# Patient Record
Sex: MALE | Race: WHITE | NOT HISPANIC OR LATINO | URBAN - METROPOLITAN AREA
[De-identification: names, ages, dates, MRNs, and addresses within clinical notes are randomized per-mention and may not be internally consistent; named-entity substitution may affect disease eponyms.]

---

## 2024-10-29 ENCOUNTER — CLINICAL SUPPORT (OUTPATIENT)
Dept: URGENT CARE | Age: 64
End: 2024-10-29
Payer: COMMERCIAL

## 2024-10-29 VITALS
HEART RATE: 76 BPM | SYSTOLIC BLOOD PRESSURE: 135 MMHG | TEMPERATURE: 97.9 F | OXYGEN SATURATION: 97 % | RESPIRATION RATE: 18 BRPM | DIASTOLIC BLOOD PRESSURE: 87 MMHG

## 2025-03-19 ENCOUNTER — HOSPITAL ENCOUNTER (INPATIENT)
Facility: HOSPITAL | Age: 65
DRG: 862 | End: 2025-03-19
Attending: EMERGENCY MEDICINE | Admitting: INTERNAL MEDICINE
Payer: COMMERCIAL

## 2025-03-19 ENCOUNTER — APPOINTMENT (OUTPATIENT)
Dept: RADIOLOGY | Facility: HOSPITAL | Age: 65
DRG: 862 | End: 2025-03-19
Payer: COMMERCIAL

## 2025-03-19 ENCOUNTER — APPOINTMENT (OUTPATIENT)
Dept: CARDIOLOGY | Facility: HOSPITAL | Age: 65
DRG: 862 | End: 2025-03-19
Payer: COMMERCIAL

## 2025-03-19 DIAGNOSIS — K65.1 PELVIC ABSCESS IN MALE (MULTI): Primary | ICD-10-CM

## 2025-03-19 LAB
ALBUMIN SERPL BCP-MCNC: 4.8 G/DL (ref 3.4–5)
ALP SERPL-CCNC: 55 U/L (ref 33–136)
ALT SERPL W P-5'-P-CCNC: 13 U/L (ref 10–52)
ANION GAP SERPL CALC-SCNC: 15 MMOL/L (ref 10–20)
APPEARANCE UR: CLEAR
AST SERPL W P-5'-P-CCNC: 16 U/L (ref 9–39)
ATRIAL RATE: 107 BPM
BASOPHILS # BLD AUTO: 0.03 X10*3/UL (ref 0–0.1)
BASOPHILS NFR BLD AUTO: 0.3 %
BILIRUB SERPL-MCNC: 1.1 MG/DL (ref 0–1.2)
BILIRUB UR STRIP.AUTO-MCNC: NEGATIVE MG/DL
BNP SERPL-MCNC: 94 PG/ML (ref 0–99)
BUN SERPL-MCNC: 13 MG/DL (ref 6–23)
CALCIUM SERPL-MCNC: 9.5 MG/DL (ref 8.6–10.3)
CARDIAC TROPONIN I PNL SERPL HS: 8 NG/L (ref 0–20)
CHLORIDE SERPL-SCNC: 98 MMOL/L (ref 98–107)
CO2 SERPL-SCNC: 24 MMOL/L (ref 21–32)
COLOR UR: ABNORMAL
CREAT SERPL-MCNC: 1.01 MG/DL (ref 0.5–1.3)
CRP SERPL-MCNC: 9.19 MG/DL
EGFRCR SERPLBLD CKD-EPI 2021: 84 ML/MIN/1.73M*2
EOSINOPHIL # BLD AUTO: 0 X10*3/UL (ref 0–0.7)
EOSINOPHIL NFR BLD AUTO: 0 %
ERYTHROCYTE [DISTWIDTH] IN BLOOD BY AUTOMATED COUNT: 13.3 % (ref 11.5–14.5)
FLUAV RNA RESP QL NAA+PROBE: NOT DETECTED
FLUBV RNA RESP QL NAA+PROBE: NOT DETECTED
GLUCOSE SERPL-MCNC: 113 MG/DL (ref 74–99)
GLUCOSE UR STRIP.AUTO-MCNC: NORMAL MG/DL
HCT VFR BLD AUTO: 44.9 % (ref 41–52)
HGB BLD-MCNC: 15.6 G/DL (ref 13.5–17.5)
IMM GRANULOCYTES # BLD AUTO: 0.04 X10*3/UL (ref 0–0.7)
IMM GRANULOCYTES NFR BLD AUTO: 0.4 % (ref 0–0.9)
KETONES UR STRIP.AUTO-MCNC: NEGATIVE MG/DL
LACTATE SERPL-SCNC: 1.6 MMOL/L (ref 0.4–2)
LEUKOCYTE ESTERASE UR QL STRIP.AUTO: NEGATIVE
LYMPHOCYTES # BLD AUTO: 0.79 X10*3/UL (ref 1.2–4.8)
LYMPHOCYTES NFR BLD AUTO: 7 %
MAGNESIUM SERPL-MCNC: 1.57 MG/DL (ref 1.6–2.4)
MCH RBC QN AUTO: 29.1 PG (ref 26–34)
MCHC RBC AUTO-ENTMCNC: 34.7 G/DL (ref 32–36)
MCV RBC AUTO: 84 FL (ref 80–100)
MONOCYTES # BLD AUTO: 0.65 X10*3/UL (ref 0.1–1)
MONOCYTES NFR BLD AUTO: 5.8 %
NEUTROPHILS # BLD AUTO: 9.78 X10*3/UL (ref 1.2–7.7)
NEUTROPHILS NFR BLD AUTO: 86.5 %
NITRITE UR QL STRIP.AUTO: NEGATIVE
NRBC BLD-RTO: 0 /100 WBCS (ref 0–0)
P AXIS: 73 DEGREES
P OFFSET: 206 MS
P ONSET: 152 MS
PH UR STRIP.AUTO: 6 [PH]
PLATELET # BLD AUTO: 236 X10*3/UL (ref 150–450)
POTASSIUM SERPL-SCNC: 3.7 MMOL/L (ref 3.5–5.3)
PR INTERVAL: 130 MS
PROT SERPL-MCNC: 8.9 G/DL (ref 6.4–8.2)
PROT UR STRIP.AUTO-MCNC: NEGATIVE MG/DL
Q ONSET: 217 MS
QRS COUNT: 18 BEATS
QRS DURATION: 94 MS
QT INTERVAL: 378 MS
QTC CALCULATION(BAZETT): 504 MS
QTC FREDERICIA: 458 MS
R AXIS: 74 DEGREES
RBC # BLD AUTO: 5.37 X10*6/UL (ref 4.5–5.9)
RBC # UR STRIP.AUTO: NEGATIVE MG/DL
SARS-COV-2 RNA RESP QL NAA+PROBE: NOT DETECTED
SODIUM SERPL-SCNC: 133 MMOL/L (ref 136–145)
SP GR UR STRIP.AUTO: >1.05
T AXIS: 63 DEGREES
T OFFSET: 406 MS
UROBILINOGEN UR STRIP.AUTO-MCNC: NORMAL MG/DL
VENTRICULAR RATE: 107 BPM
WBC # BLD AUTO: 11.3 X10*3/UL (ref 4.4–11.3)

## 2025-03-19 PROCEDURE — 71046 X-RAY EXAM CHEST 2 VIEWS: CPT | Performed by: RADIOLOGY

## 2025-03-19 PROCEDURE — 74177 CT ABD & PELVIS W/CONTRAST: CPT | Performed by: RADIOLOGY

## 2025-03-19 PROCEDURE — 2500000004 HC RX 250 GENERAL PHARMACY W/ HCPCS (ALT 636 FOR OP/ED): Performed by: GENERAL PRACTICE

## 2025-03-19 PROCEDURE — 85025 COMPLETE CBC W/AUTO DIFF WBC: CPT | Performed by: NURSE PRACTITIONER

## 2025-03-19 PROCEDURE — 96375 TX/PRO/DX INJ NEW DRUG ADDON: CPT

## 2025-03-19 PROCEDURE — 2500000005 HC RX 250 GENERAL PHARMACY W/O HCPCS

## 2025-03-19 PROCEDURE — 80053 COMPREHEN METABOLIC PANEL: CPT | Performed by: NURSE PRACTITIONER

## 2025-03-19 PROCEDURE — 70450 CT HEAD/BRAIN W/O DYE: CPT

## 2025-03-19 PROCEDURE — 87075 CULTR BACTERIA EXCEPT BLOOD: CPT | Mod: AHULAB | Performed by: GENERAL PRACTICE

## 2025-03-19 PROCEDURE — 87075 CULTR BACTERIA EXCEPT BLOOD: CPT | Mod: AHULAB | Performed by: NURSE PRACTITIONER

## 2025-03-19 PROCEDURE — 70450 CT HEAD/BRAIN W/O DYE: CPT | Performed by: RADIOLOGY

## 2025-03-19 PROCEDURE — 36415 COLL VENOUS BLD VENIPUNCTURE: CPT | Performed by: NURSE PRACTITIONER

## 2025-03-19 PROCEDURE — 83880 ASSAY OF NATRIURETIC PEPTIDE: CPT | Performed by: NURSE PRACTITIONER

## 2025-03-19 PROCEDURE — 87040 BLOOD CULTURE FOR BACTERIA: CPT | Mod: AHULAB | Performed by: NURSE PRACTITIONER

## 2025-03-19 PROCEDURE — 2550000001 HC RX 255 CONTRASTS: Performed by: NURSE PRACTITIONER

## 2025-03-19 PROCEDURE — 87636 SARSCOV2 & INF A&B AMP PRB: CPT | Performed by: NURSE PRACTITIONER

## 2025-03-19 PROCEDURE — 81003 URINALYSIS AUTO W/O SCOPE: CPT | Performed by: NURSE PRACTITIONER

## 2025-03-19 PROCEDURE — 36415 COLL VENOUS BLD VENIPUNCTURE: CPT | Performed by: GENERAL PRACTICE

## 2025-03-19 PROCEDURE — 93005 ELECTROCARDIOGRAM TRACING: CPT

## 2025-03-19 PROCEDURE — 71046 X-RAY EXAM CHEST 2 VIEWS: CPT

## 2025-03-19 PROCEDURE — 96365 THER/PROPH/DIAG IV INF INIT: CPT

## 2025-03-19 PROCEDURE — 86140 C-REACTIVE PROTEIN: CPT | Performed by: INTERNAL MEDICINE

## 2025-03-19 PROCEDURE — 99285 EMERGENCY DEPT VISIT HI MDM: CPT | Mod: 25 | Performed by: EMERGENCY MEDICINE

## 2025-03-19 PROCEDURE — 2500000004 HC RX 250 GENERAL PHARMACY W/ HCPCS (ALT 636 FOR OP/ED)

## 2025-03-19 PROCEDURE — 84484 ASSAY OF TROPONIN QUANT: CPT | Performed by: NURSE PRACTITIONER

## 2025-03-19 PROCEDURE — 83605 ASSAY OF LACTIC ACID: CPT | Performed by: NURSE PRACTITIONER

## 2025-03-19 PROCEDURE — 2500000001 HC RX 250 WO HCPCS SELF ADMINISTERED DRUGS (ALT 637 FOR MEDICARE OP): Performed by: GENERAL PRACTICE

## 2025-03-19 PROCEDURE — 83735 ASSAY OF MAGNESIUM: CPT | Performed by: NURSE PRACTITIONER

## 2025-03-19 PROCEDURE — 1100000001 HC PRIVATE ROOM DAILY

## 2025-03-19 PROCEDURE — 74177 CT ABD & PELVIS W/CONTRAST: CPT

## 2025-03-19 RX ORDER — ACETAMINOPHEN 325 MG/1
975 TABLET ORAL ONCE
Status: COMPLETED | OUTPATIENT
Start: 2025-03-19 | End: 2025-03-19

## 2025-03-19 RX ORDER — MAGNESIUM SULFATE HEPTAHYDRATE 40 MG/ML
2 INJECTION, SOLUTION INTRAVENOUS ONCE
Status: COMPLETED | OUTPATIENT
Start: 2025-03-19 | End: 2025-03-20

## 2025-03-19 RX ORDER — TALC
6 POWDER (GRAM) TOPICAL NIGHTLY
Status: DISCONTINUED | OUTPATIENT
Start: 2025-03-19 | End: 2025-03-24 | Stop reason: HOSPADM

## 2025-03-19 RX ORDER — ACETAMINOPHEN 650 MG/1
650 SUPPOSITORY RECTAL EVERY 4 HOURS PRN
Status: DISCONTINUED | OUTPATIENT
Start: 2025-03-19 | End: 2025-03-24 | Stop reason: HOSPADM

## 2025-03-19 RX ORDER — ACETAMINOPHEN 160 MG/5ML
650 SOLUTION ORAL EVERY 6 HOURS PRN
Status: DISCONTINUED | OUTPATIENT
Start: 2025-03-19 | End: 2025-03-24 | Stop reason: HOSPADM

## 2025-03-19 RX ORDER — VANCOMYCIN HYDROCHLORIDE 1 G/20ML
INJECTION, POWDER, LYOPHILIZED, FOR SOLUTION INTRAVENOUS DAILY PRN
Status: DISCONTINUED | OUTPATIENT
Start: 2025-03-19 | End: 2025-03-19

## 2025-03-19 RX ORDER — POLYETHYLENE GLYCOL 3350 17 G/17G
17 POWDER, FOR SOLUTION ORAL DAILY
Status: DISCONTINUED | OUTPATIENT
Start: 2025-03-20 | End: 2025-03-24 | Stop reason: HOSPADM

## 2025-03-19 RX ORDER — ONDANSETRON HYDROCHLORIDE 2 MG/ML
4 INJECTION, SOLUTION INTRAVENOUS EVERY 8 HOURS PRN
Status: DISCONTINUED | OUTPATIENT
Start: 2025-03-19 | End: 2025-03-24 | Stop reason: HOSPADM

## 2025-03-19 RX ORDER — ONDANSETRON 4 MG/1
4 TABLET, FILM COATED ORAL EVERY 8 HOURS PRN
Status: DISCONTINUED | OUTPATIENT
Start: 2025-03-19 | End: 2025-03-24 | Stop reason: HOSPADM

## 2025-03-19 RX ORDER — ENOXAPARIN SODIUM 100 MG/ML
40 INJECTION SUBCUTANEOUS EVERY 24 HOURS
Status: DISCONTINUED | OUTPATIENT
Start: 2025-03-20 | End: 2025-03-24 | Stop reason: HOSPADM

## 2025-03-19 RX ORDER — LANOLIN ALCOHOL/MO/W.PET/CERES
400 CREAM (GRAM) TOPICAL ONCE
Status: COMPLETED | OUTPATIENT
Start: 2025-03-19 | End: 2025-03-19

## 2025-03-19 RX ORDER — VANCOMYCIN HYDROCHLORIDE 1 G/20ML
INJECTION, POWDER, LYOPHILIZED, FOR SOLUTION INTRAVENOUS DAILY PRN
Status: DISCONTINUED | OUTPATIENT
Start: 2025-03-19 | End: 2025-03-20

## 2025-03-19 RX ORDER — ACETAMINOPHEN 325 MG/1
975 TABLET ORAL EVERY 8 HOURS PRN
Status: DISCONTINUED | OUTPATIENT
Start: 2025-03-19 | End: 2025-03-24 | Stop reason: HOSPADM

## 2025-03-19 RX ADMIN — IOHEXOL 75 ML: 350 INJECTION, SOLUTION INTRAVENOUS at 17:28

## 2025-03-19 RX ADMIN — VANCOMYCIN HYDROCHLORIDE 2000 MG: 5 INJECTION, POWDER, LYOPHILIZED, FOR SOLUTION INTRAVENOUS at 21:00

## 2025-03-19 RX ADMIN — PIPERACILLIN SODIUM AND TAZOBACTAM SODIUM 3.38 G: 3; .375 INJECTION, SOLUTION INTRAVENOUS at 19:34

## 2025-03-19 RX ADMIN — ACETAMINOPHEN 975 MG: 325 TABLET, FILM COATED ORAL at 19:31

## 2025-03-19 RX ADMIN — Medication 400 MG: at 19:31

## 2025-03-19 SDOH — SOCIAL STABILITY: SOCIAL INSECURITY: DOES ANYONE TRY TO KEEP YOU FROM HAVING/CONTACTING OTHER FRIENDS OR DOING THINGS OUTSIDE YOUR HOME?: NO

## 2025-03-19 SDOH — SOCIAL STABILITY: SOCIAL INSECURITY
WITHIN THE LAST YEAR, HAVE YOU BEEN KICKED, HIT, SLAPPED, OR OTHERWISE PHYSICALLY HURT BY YOUR PARTNER OR EX-PARTNER?: NO

## 2025-03-19 SDOH — ECONOMIC STABILITY: HOUSING INSECURITY: IN THE LAST 12 MONTHS, WAS THERE A TIME WHEN YOU WERE NOT ABLE TO PAY THE MORTGAGE OR RENT ON TIME?: NO

## 2025-03-19 SDOH — ECONOMIC STABILITY: FOOD INSECURITY: WITHIN THE PAST 12 MONTHS, THE FOOD YOU BOUGHT JUST DIDN'T LAST AND YOU DIDN'T HAVE MONEY TO GET MORE.: NEVER TRUE

## 2025-03-19 SDOH — SOCIAL STABILITY: SOCIAL INSECURITY: DO YOU FEEL ANYONE HAS EXPLOITED OR TAKEN ADVANTAGE OF YOU FINANCIALLY OR OF YOUR PERSONAL PROPERTY?: NO

## 2025-03-19 SDOH — ECONOMIC STABILITY: HOUSING INSECURITY: AT ANY TIME IN THE PAST 12 MONTHS, WERE YOU HOMELESS OR LIVING IN A SHELTER (INCLUDING NOW)?: NO

## 2025-03-19 SDOH — SOCIAL STABILITY: SOCIAL INSECURITY: WITHIN THE LAST YEAR, HAVE YOU BEEN HUMILIATED OR EMOTIONALLY ABUSED IN OTHER WAYS BY YOUR PARTNER OR EX-PARTNER?: NO

## 2025-03-19 SDOH — SOCIAL STABILITY: SOCIAL INSECURITY: ARE YOU OR HAVE YOU BEEN THREATENED OR ABUSED PHYSICALLY, EMOTIONALLY, OR SEXUALLY BY ANYONE?: NO

## 2025-03-19 SDOH — SOCIAL STABILITY: SOCIAL INSECURITY: ARE THERE ANY APPARENT SIGNS OF INJURIES/BEHAVIORS THAT COULD BE RELATED TO ABUSE/NEGLECT?: NO

## 2025-03-19 SDOH — ECONOMIC STABILITY: FOOD INSECURITY: WITHIN THE PAST 12 MONTHS, YOU WORRIED THAT YOUR FOOD WOULD RUN OUT BEFORE YOU GOT THE MONEY TO BUY MORE.: NEVER TRUE

## 2025-03-19 SDOH — SOCIAL STABILITY: SOCIAL INSECURITY: WITHIN THE LAST YEAR, HAVE YOU BEEN AFRAID OF YOUR PARTNER OR EX-PARTNER?: NO

## 2025-03-19 SDOH — ECONOMIC STABILITY: INCOME INSECURITY: IN THE PAST 12 MONTHS HAS THE ELECTRIC, GAS, OIL, OR WATER COMPANY THREATENED TO SHUT OFF SERVICES IN YOUR HOME?: NO

## 2025-03-19 SDOH — SOCIAL STABILITY: SOCIAL INSECURITY: HAS ANYONE EVER THREATENED TO HURT YOUR FAMILY OR YOUR PETS?: NO

## 2025-03-19 SDOH — ECONOMIC STABILITY: TRANSPORTATION INSECURITY: IN THE PAST 12 MONTHS, HAS LACK OF TRANSPORTATION KEPT YOU FROM MEDICAL APPOINTMENTS OR FROM GETTING MEDICATIONS?: NO

## 2025-03-19 SDOH — ECONOMIC STABILITY: FOOD INSECURITY: HOW HARD IS IT FOR YOU TO PAY FOR THE VERY BASICS LIKE FOOD, HOUSING, MEDICAL CARE, AND HEATING?: NOT HARD AT ALL

## 2025-03-19 SDOH — SOCIAL STABILITY: SOCIAL INSECURITY: HAVE YOU HAD ANY THOUGHTS OF HARMING ANYONE ELSE?: NO

## 2025-03-19 SDOH — SOCIAL STABILITY: SOCIAL INSECURITY
WITHIN THE LAST YEAR, HAVE YOU BEEN RAPED OR FORCED TO HAVE ANY KIND OF SEXUAL ACTIVITY BY YOUR PARTNER OR EX-PARTNER?: NO

## 2025-03-19 SDOH — SOCIAL STABILITY: SOCIAL INSECURITY: WERE YOU ABLE TO COMPLETE ALL THE BEHAVIORAL HEALTH SCREENINGS?: YES

## 2025-03-19 SDOH — SOCIAL STABILITY: SOCIAL INSECURITY: HAVE YOU HAD THOUGHTS OF HARMING ANYONE ELSE?: NO

## 2025-03-19 SDOH — ECONOMIC STABILITY: HOUSING INSECURITY: IN THE PAST 12 MONTHS, HOW MANY TIMES HAVE YOU MOVED WHERE YOU WERE LIVING?: 0

## 2025-03-19 SDOH — SOCIAL STABILITY: SOCIAL INSECURITY: ABUSE: ADULT

## 2025-03-19 SDOH — SOCIAL STABILITY: SOCIAL INSECURITY: DO YOU FEEL UNSAFE GOING BACK TO THE PLACE WHERE YOU ARE LIVING?: NO

## 2025-03-19 ASSESSMENT — COGNITIVE AND FUNCTIONAL STATUS - GENERAL
PATIENT BASELINE BEDBOUND: NO
DAILY ACTIVITIY SCORE: 24
MOBILITY SCORE: 24

## 2025-03-19 ASSESSMENT — ACTIVITIES OF DAILY LIVING (ADL)
LACK_OF_TRANSPORTATION: NO
ADEQUATE_TO_COMPLETE_ADL: YES
FEEDING YOURSELF: INDEPENDENT
GROOMING: INDEPENDENT
PATIENT'S MEMORY ADEQUATE TO SAFELY COMPLETE DAILY ACTIVITIES?: YES
BATHING: INDEPENDENT
HEARING - LEFT EAR: FUNCTIONAL
JUDGMENT_ADEQUATE_SAFELY_COMPLETE_DAILY_ACTIVITIES: YES
HEARING - RIGHT EAR: FUNCTIONAL
LACK_OF_TRANSPORTATION: NO
WALKS IN HOME: INDEPENDENT
DRESSING YOURSELF: INDEPENDENT
TOILETING: INDEPENDENT

## 2025-03-19 ASSESSMENT — PAIN SCALES - GENERAL
PAINLEVEL_OUTOF10: 0 - NO PAIN

## 2025-03-19 ASSESSMENT — PAIN - FUNCTIONAL ASSESSMENT
PAIN_FUNCTIONAL_ASSESSMENT: 0-10
PAIN_FUNCTIONAL_ASSESSMENT: 0-10

## 2025-03-19 ASSESSMENT — PATIENT HEALTH QUESTIONNAIRE - PHQ9
SUM OF ALL RESPONSES TO PHQ9 QUESTIONS 1 & 2: 0
1. LITTLE INTEREST OR PLEASURE IN DOING THINGS: NOT AT ALL
2. FEELING DOWN, DEPRESSED OR HOPELESS: NOT AT ALL

## 2025-03-19 ASSESSMENT — LIFESTYLE VARIABLES
AUDIT-C TOTAL SCORE: 2
SKIP TO QUESTIONS 9-10: 1
AUDIT-C TOTAL SCORE: 2
HOW OFTEN DO YOU HAVE 6 OR MORE DRINKS ON ONE OCCASION: NEVER
HOW OFTEN DO YOU HAVE A DRINK CONTAINING ALCOHOL: 2-4 TIMES A MONTH
HOW MANY STANDARD DRINKS CONTAINING ALCOHOL DO YOU HAVE ON A TYPICAL DAY: 1 OR 2

## 2025-03-19 NOTE — ED TRIAGE NOTES
Secondary to patient volumes and overcrowding, I performed a brief medical screening exam of the patient in triage, as the patient awaits space in the main ED.    History of Present Illness:  Alexandre Barraza presents with   Chief Complaint   Patient presents with    Dizziness       Physical Exam:  General - In no acute distress  Respiratory - Breathing comfortably  Cardiac - Normal S1, S2, no m/g/r  Neuro - No focal neurologic deficits. Cranial nerves normal as tested from III through XII.  Bilateral upper and lower extremity strengths intact 5/5, sensation intact, DTRs 2+, no drift.  No neglect, no dysarthria, no word finding abnormality, finger-to-nose normal.   Eyes - EOMI, PERRL.  Test of skew negative.    Medical Decision Making:  Patient will require further evaluation in the main ED.    Initial diagnostic tests were ordered from triage.    The patient demonstrates understanding that this initial evaluation is a brief medical screening exam and the expectation is that they await for space in the main ED to be further evaluated.  The patient understands that, if they leave prior to further evaluation in the main ED after this initial evaluation in triage, they are doing so under their own accord knowing that their evaluation/work-up is not yet complete. The patient also understands that any preliminary diagnostic results, including abnormalities, may not be shared with them, if they choose to leave prior to further evaluation in the main ED.

## 2025-03-19 NOTE — ED PROVIDER NOTES
HPI   Chief Complaint   Patient presents with    Dizziness       HPI        Patient History   History reviewed. No pertinent past medical history.  Past Surgical History:   Procedure Laterality Date    TONSILLECTOMY  02/04/2014    Tonsillectomy With Adenoidectomy     No family history on file.  Social History     Tobacco Use    Smoking status: Unknown    Smokeless tobacco: Not on file   Substance Use Topics    Alcohol use: Not on file    Drug use: Not on file       Physical Exam   ED Triage Vitals [03/19/25 1607]   Temperature Heart Rate Respirations BP   (!) 39.2 °C (102.6 °F) (!) 105 18 123/77      Pulse Ox Temp Source Heart Rate Source Patient Position   95 % Temporal Monitor --      BP Location FiO2 (%)     -- --       Physical Exam      ED Course & MDM                  No data recorded     [place] Coma Scale Score: 15 (03/19/25 1605 : Marisela Durán RN)                           Medical Decision Making      Procedure  Procedures

## 2025-03-19 NOTE — ED TRIAGE NOTES
Pt to ED from home with complaint of dizziness. Pt states he was taking a shower and suddenly got chills and dizziness. Pt has nausea, fatigue, and struggling to find words (per partner). Pt lowered self to floor in shower, denies hitting head.    Provider at triage for Neuro assessment.     Pt denies chest pain, pt denies SOB

## 2025-03-20 ENCOUNTER — APPOINTMENT (OUTPATIENT)
Dept: RADIOLOGY | Facility: HOSPITAL | Age: 65
DRG: 862 | End: 2025-03-20
Payer: COMMERCIAL

## 2025-03-20 LAB
ANION GAP SERPL CALC-SCNC: 10 MMOL/L (ref 10–20)
BUN SERPL-MCNC: 16 MG/DL (ref 6–23)
CALCIUM SERPL-MCNC: 8.4 MG/DL (ref 8.6–10.3)
CHLORIDE SERPL-SCNC: 104 MMOL/L (ref 98–107)
CO2 SERPL-SCNC: 27 MMOL/L (ref 21–32)
CREAT SERPL-MCNC: 0.99 MG/DL (ref 0.5–1.3)
EGFRCR SERPLBLD CKD-EPI 2021: 86 ML/MIN/1.73M*2
GLUCOSE SERPL-MCNC: 111 MG/DL (ref 74–99)
HOLD SPECIMEN: NORMAL
INR PPP: 1.3 (ref 0.9–1.1)
POTASSIUM SERPL-SCNC: 4 MMOL/L (ref 3.5–5.3)
PROTHROMBIN TIME: 14.1 SECONDS (ref 9.8–12.4)
SODIUM SERPL-SCNC: 137 MMOL/L (ref 136–145)

## 2025-03-20 PROCEDURE — 2500000004 HC RX 250 GENERAL PHARMACY W/ HCPCS (ALT 636 FOR OP/ED): Performed by: STUDENT IN AN ORGANIZED HEALTH CARE EDUCATION/TRAINING PROGRAM

## 2025-03-20 PROCEDURE — 87075 CULTR BACTERIA EXCEPT BLOOD: CPT | Mod: AHULAB | Performed by: STUDENT IN AN ORGANIZED HEALTH CARE EDUCATION/TRAINING PROGRAM

## 2025-03-20 PROCEDURE — 2500000001 HC RX 250 WO HCPCS SELF ADMINISTERED DRUGS (ALT 637 FOR MEDICARE OP): Performed by: INTERNAL MEDICINE

## 2025-03-20 PROCEDURE — 0W9G30Z DRAINAGE OF PERITONEAL CAVITY WITH DRAINAGE DEVICE, PERCUTANEOUS APPROACH: ICD-10-PCS | Performed by: STUDENT IN AN ORGANIZED HEALTH CARE EDUCATION/TRAINING PROGRAM

## 2025-03-20 PROCEDURE — 36415 COLL VENOUS BLD VENIPUNCTURE: CPT

## 2025-03-20 PROCEDURE — 1100000001 HC PRIVATE ROOM DAILY

## 2025-03-20 PROCEDURE — 2720000007 HC OR 272 NO HCPCS

## 2025-03-20 PROCEDURE — 85610 PROTHROMBIN TIME: CPT | Performed by: INTERNAL MEDICINE

## 2025-03-20 PROCEDURE — 99152 MOD SED SAME PHYS/QHP 5/>YRS: CPT

## 2025-03-20 PROCEDURE — 99153 MOD SED SAME PHYS/QHP EA: CPT

## 2025-03-20 PROCEDURE — 49405 IMAGE CATH FLUID COLXN VISC: CPT

## 2025-03-20 PROCEDURE — 99221 1ST HOSP IP/OBS SF/LOW 40: CPT | Performed by: NURSE PRACTITIONER

## 2025-03-20 PROCEDURE — 2500000004 HC RX 250 GENERAL PHARMACY W/ HCPCS (ALT 636 FOR OP/ED): Performed by: INTERNAL MEDICINE

## 2025-03-20 PROCEDURE — C1769 GUIDE WIRE: HCPCS

## 2025-03-20 PROCEDURE — 2500000005 HC RX 250 GENERAL PHARMACY W/O HCPCS: Performed by: INTERNAL MEDICINE

## 2025-03-20 PROCEDURE — C1729 CATH, DRAINAGE: HCPCS

## 2025-03-20 PROCEDURE — 80048 BASIC METABOLIC PNL TOTAL CA: CPT

## 2025-03-20 PROCEDURE — 99223 1ST HOSP IP/OBS HIGH 75: CPT | Performed by: INTERNAL MEDICINE

## 2025-03-20 RX ORDER — FENTANYL CITRATE 50 UG/ML
INJECTION, SOLUTION INTRAMUSCULAR; INTRAVENOUS
Status: COMPLETED | OUTPATIENT
Start: 2025-03-20 | End: 2025-03-20

## 2025-03-20 RX ORDER — LIDOCAINE HYDROCHLORIDE 10 MG/ML
INJECTION, SOLUTION EPIDURAL; INFILTRATION; INTRACAUDAL; PERINEURAL
Status: COMPLETED | OUTPATIENT
Start: 2025-03-20 | End: 2025-03-20

## 2025-03-20 RX ORDER — MIDAZOLAM HYDROCHLORIDE 1 MG/ML
INJECTION INTRAMUSCULAR; INTRAVENOUS
Status: COMPLETED | OUTPATIENT
Start: 2025-03-20 | End: 2025-03-20

## 2025-03-20 RX ORDER — CALCIUM CARBONATE 200(500)MG
500 TABLET,CHEWABLE ORAL DAILY
Status: DISCONTINUED | OUTPATIENT
Start: 2025-03-20 | End: 2025-03-24 | Stop reason: HOSPADM

## 2025-03-20 RX ORDER — TADALAFIL 5 MG/1
5 TABLET ORAL DAILY PRN
COMMUNITY

## 2025-03-20 RX ORDER — MORPHINE SULFATE 2 MG/ML
2 INJECTION, SOLUTION INTRAMUSCULAR; INTRAVENOUS EVERY 4 HOURS PRN
Status: DISCONTINUED | OUTPATIENT
Start: 2025-03-20 | End: 2025-03-24 | Stop reason: HOSPADM

## 2025-03-20 RX ADMIN — Medication 6 MG: at 20:42

## 2025-03-20 RX ADMIN — PIPERACILLIN SODIUM AND TAZOBACTAM SODIUM 3.38 G: 3; .375 INJECTION, SOLUTION INTRAVENOUS at 10:35

## 2025-03-20 RX ADMIN — PIPERACILLIN SODIUM AND TAZOBACTAM SODIUM 3.38 G: 3; .375 INJECTION, SOLUTION INTRAVENOUS at 02:20

## 2025-03-20 RX ADMIN — ACETAMINOPHEN 975 MG: 325 TABLET, FILM COATED ORAL at 19:37

## 2025-03-20 RX ADMIN — ENOXAPARIN SODIUM 40 MG: 40 INJECTION SUBCUTANEOUS at 16:30

## 2025-03-20 RX ADMIN — MORPHINE SULFATE 2 MG: 2 INJECTION, SOLUTION INTRAMUSCULAR; INTRAVENOUS at 22:02

## 2025-03-20 RX ADMIN — MIDAZOLAM HYDROCHLORIDE 1 MG: 1 INJECTION, SOLUTION INTRAMUSCULAR; INTRAVENOUS at 14:52

## 2025-03-20 RX ADMIN — PIPERACILLIN SODIUM AND TAZOBACTAM SODIUM 3.38 G: 3; .375 INJECTION, SOLUTION INTRAVENOUS at 17:58

## 2025-03-20 RX ADMIN — CALCIUM CARBONATE (ANTACID) CHEW TAB 500 MG 500 MG: 500 CHEW TAB at 08:11

## 2025-03-20 RX ADMIN — FENTANYL CITRATE 50 MCG: 50 INJECTION INTRAMUSCULAR; INTRAVENOUS at 15:11

## 2025-03-20 RX ADMIN — MIDAZOLAM HYDROCHLORIDE 1 MG: 1 INJECTION, SOLUTION INTRAMUSCULAR; INTRAVENOUS at 15:11

## 2025-03-20 RX ADMIN — LIDOCAINE HYDROCHLORIDE 10 ML: 10 INJECTION, SOLUTION EPIDURAL; INFILTRATION; INTRACAUDAL; PERINEURAL at 15:12

## 2025-03-20 RX ADMIN — LIDOCAINE HYDROCHLORIDE 10 ML: 10 INJECTION, SOLUTION EPIDURAL; INFILTRATION; INTRACAUDAL; PERINEURAL at 15:27

## 2025-03-20 RX ADMIN — FENTANYL CITRATE 50 MCG: 50 INJECTION INTRAMUSCULAR; INTRAVENOUS at 14:51

## 2025-03-20 RX ADMIN — ACETAMINOPHEN 975 MG: 325 TABLET, FILM COATED ORAL at 08:11

## 2025-03-20 RX ADMIN — SODIUM CHLORIDE 1000 ML: 0.9 INJECTION, SOLUTION INTRAVENOUS at 00:47

## 2025-03-20 RX ADMIN — MAGNESIUM SULFATE HEPTAHYDRATE 2 G: 40 INJECTION, SOLUTION INTRAVENOUS at 00:06

## 2025-03-20 ASSESSMENT — PAIN SCALES - GENERAL
PAINLEVEL_OUTOF10: 0 - NO PAIN
PAINLEVEL_OUTOF10: 7
PAINLEVEL_OUTOF10: 0 - NO PAIN
PAINLEVEL_OUTOF10: 7
PAINLEVEL_OUTOF10: 0 - NO PAIN
PAINLEVEL_OUTOF10: 5 - MODERATE PAIN
PAINLEVEL_OUTOF10: 0 - NO PAIN

## 2025-03-20 ASSESSMENT — COGNITIVE AND FUNCTIONAL STATUS - GENERAL
DAILY ACTIVITIY SCORE: 24
MOBILITY SCORE: 24
DAILY ACTIVITIY SCORE: 24
MOBILITY SCORE: 24

## 2025-03-20 ASSESSMENT — PAIN DESCRIPTION - LOCATION
LOCATION: ABDOMEN
LOCATION: OTHER (COMMENT)

## 2025-03-20 ASSESSMENT — ENCOUNTER SYMPTOMS
MUSCULOSKELETAL NEGATIVE: 1
LIGHT-HEADEDNESS: 1
CHILLS: 1
PSYCHIATRIC NEGATIVE: 1
CARDIOVASCULAR NEGATIVE: 1
RESPIRATORY NEGATIVE: 1
NAUSEA: 1

## 2025-03-20 ASSESSMENT — PAIN - FUNCTIONAL ASSESSMENT
PAIN_FUNCTIONAL_ASSESSMENT: 0-10

## 2025-03-20 ASSESSMENT — ACTIVITIES OF DAILY LIVING (ADL): LACK_OF_TRANSPORTATION: NO

## 2025-03-20 ASSESSMENT — PAIN DESCRIPTION - DESCRIPTORS: DESCRIPTORS: DISCOMFORT

## 2025-03-20 ASSESSMENT — PAIN DESCRIPTION - ORIENTATION: ORIENTATION: RIGHT

## 2025-03-20 ASSESSMENT — PAIN SCALES - WONG BAKER: WONGBAKER_NUMERICALRESPONSE: HURTS LITTLE MORE

## 2025-03-20 NOTE — PROGRESS NOTES
Vancomycin Dosing by Pharmacy- INITIAL    Alexandre Barraza is a 64 y.o. year old male who Pharmacy has been consulted for vancomycin dosing for other Abdominal infection, empiric, Community Acquired . Based on the patient's indication and renal status this patient will be dosed based on a goal AUC of 400-600.     Renal function is currently stable.    Visit Vitals  /67 (BP Location: Right arm, Patient Position: Lying)   Pulse 81   Temp 37.2 °C (99 °F) (Temporal)   Resp 16        Lab Results   Component Value Date    CREATININE 1.01 2025        Patient weight is as follows:   Vitals:    25 2301   Weight: 84.1 kg (185 lb 6.5 oz)       Cultures:  No results found for the encounter in last 14 days.        No intake/output data recorded.  I/O during current shift:  No intake/output data recorded.    Temp (24hrs), Av.1 °C (100.5 °F), Min:37.2 °C (99 °F), Max:39.2 °C (102.6 °F)         Assessment/Plan     Patient has already been given a loading dose of 2000 mg given 2025 at 21:00  Will initiate vancomycin maintenance,  1750 mg every 24 hours.    This dosing regimen is predicted by [facility] to result in the following pharmacokinetic parameters:  Exposure target: AUC24 (range)400-600 mg/L.hr   VAO07-27: 447 mg/L.hr  AUC24,ss: 481 mg/L.hr  Probability of AUC24 > 400: 70 %  Ctrough,ss: 12.9 mg/L  Probability of Ctrough,ss > 20: 18 %      Follow-up level will be ordered on 2025 at 05:00 unless clinically indicated sooner.  Will continue to monitor renal function daily while on vancomycin and order serum creatinine at least every 48 hours if not already ordered.  Follow for continued vancomycin needs, clinical response, and signs/symptoms of toxicity.       Mecca Ordaz, CedricD

## 2025-03-20 NOTE — PROGRESS NOTES
Pharmacy Medication History     Source of Information: Patient     Additional concerns with the patient's PTA list.   ...No Meds... Except Tadalafil    The following updates were made to the Prior to Admission medication list:     Medications ADDED:   Tadalafil 5 mg tablet  Medications CHANGED:  N/A  Medications REMOVED:   N/A  Medications NOT TAKING:   N/A    Allergy reviewed : Yes    Meds 2 Beds : Yes    Outpatient pharmacy confirmed and updated in chart : Yes    Pharmacy name: [facility] ( [address]    The list below reflectives the updated PTA list. Please review each medication in order reconciliation for additional clarification and justification.    Prior to Admission Medications   Prescriptions Last Dose Informant   tadalafil (Cialis) 5 mg tablet 3/18/2025 Evening    Sig: Take 1 tablet (5 mg) by mouth once daily as needed for erectile dysfunction. Take 1-2 hours before sexual activity.      Facility-Administered Medications: None       The list below reflectives the updated allergy list. Please review each documented allergy for additional clarification and justification.    No Known Allergies       03/20/25 at 5:03 PM - Keerthi Avila

## 2025-03-20 NOTE — CARE PLAN
The patient's goals for the shift include      The clinical goals for the shift include pain management    Problem: Safety - Adult  Goal: Free from fall injury  Outcome: Progressing     Problem: Pain - Adult  Goal: Verbalizes/displays adequate comfort level or baseline comfort level  Outcome: Progressing

## 2025-03-20 NOTE — POST-PROCEDURE NOTE
Interventional Radiology Brief Postprocedure Note    Attending: Willie Ohara    Diagnosis: Pelvic fluid collections    Description of procedure: CT guided placement of a left 10F drainage catheter into the pelvic collection with removal of approximately 30cc clear yellow fluid, fluid sent for analysis.  CT guided placement of a right 10F drainage catheter into the pelvic collection with removal of approximately 10cc clear serosanguineous fluid, fluid sent for analysis.  Near complete resolution of the fluid collections on post procedure imaging.    Anesthesia:  Local and MAC Moderate    Complications: None    Estimated Blood Loss: minimal    Medications  As of 03/20/25 1605      magnesium oxide (Mag-Ox) tablet 400 mg (mg) Total dose:  400 mg Dosing weight:  83.9      Date/Time Rate/Dose/Volume Action       03/19/25  1931 400 mg Given               piperacillin-tazobactam (Zosyn) 3.375 g in dextrose (iso) IV 50 mL (g) Total dose:  3.375 g* Dosing weight:  83.9   *From user-documented volume     Date/Time Rate/Dose/Volume Action       03/19/25  1934 3.375 g (over 30 min) New Bag      2015  (over 30 min) Stopped     03/20/25  0220 3.375 g (over 30 min) New Bag      0250 50 mL       0325  (over 30 min) Stopped      1035 3.375 g (over 30 min) New Bag      1144  (over 30 min) Stopped               acetaminophen (Tylenol) tablet 975 mg (mg) Total dose:  1,950 mg Dosing weight:  83.9      Date/Time Rate/Dose/Volume Action       03/19/25  1931 975 mg Given     03/20/25  0811 975 mg Given               vancomycin (Vancocin) 2,000 mg in dextrose 5% 500 mL IV (mg) Total dose:  2,000 mg Dosing weight:  83.9      Date/Time Rate/Dose/Volume Action       03/19/25  2100 2,000 mg (over 120 min) New Bag      2308  (over 120 min) Stopped               magnesium sulfate 2 g in sterile water for injection 50 mL (mL/hr) Total dose:  2 g* Dosing weight:  83.9   *From user-documented volume     Date/Time Rate/Dose/Volume Action        03/20/25  0006 2 g - 25 mL/hr (over 120 min) New Bag      0219  (over 120 min) Stopped               acetaminophen (Tylenol) oral liquid 650 mg (mg) Total dose:  Cannot be calculated* Dosing weight:  83.9   *Administration dose not documented     Date/Time Rate/Dose/Volume Action       03/20/25 0811 *Not included in total See Alternative               acetaminophen (Tylenol) suppository 650 mg (mg) Total dose:  Cannot be calculated* Dosing weight:  83.9   *Administration dose not documented     Date/Time Rate/Dose/Volume Action       03/20/25 0811 *Not included in total See Alternative               melatonin tablet 6 mg (mg) Total dose:  0 mg* Dosing weight:  83.9   *Administration not included in total     Date/Time Rate/Dose/Volume Action       03/20/25  0008 *6 mg Missed               polyethylene glycol (Glycolax, Miralax) packet 17 g (g) Total dose:  0 g* Dosing weight:  83.9   *Administration not included in total     Date/Time Rate/Dose/Volume Action       03/20/25  1021 *17 g Missed               sodium chloride 0.9 % bolus 1,000 mL (mL/hr) Total volume:  1,000 mL* Dosing weight:  84.1   *From user-documented volume     Date/Time Rate/Dose/Volume Action       03/20/25  0047 1,000 mL - 500 mL/hr (over 120 min) New Bag      0325 1,000 mL Stopped               calcium carbonate (Tums) chewable tablet 500 mg (mg) Total dose:  500 mg Dosing weight:  84.1      Date/Time Rate/Dose/Volume Action       03/20/25  0811 500 mg Given               fentaNYL PF (Sublimaze) injection (mcg) Total dose:  100 mcg      Date/Time Rate/Dose/Volume Action       03/20/25  1451 50 mcg Given      1511 50 mcg Given               midazolam (Versed) injection (mg) Total dose:  2 mg      Date/Time Rate/Dose/Volume Action       03/20/25  1452 1 mg Given      1511 1 mg Given               lidocaine PF (Xylocaine) 10 mg/mL (1 %) injection (mL) Total volume:  20 mL      Date/Time Rate/Dose/Volume Action       03/20/25  1512 10 mL Given       1527 10 mL Given                   Fluid aspiration sent from left pelvic fluid collection. Fluid aspiration sent from right pelvic fluid collection.      See detailed result report with images in PACS.    The patient tolerated the procedure well without incident or complication and is in stable condition.

## 2025-03-20 NOTE — PROGRESS NOTES
Emergency Medicine Transition of Care Note.    I received Alexandre Barraza in signout from Dr. De Jesus.  Please see the previous ED provider note for all HPI, PE and MDM up to the time of signout. This is in addition to the primary record.    In brief Alexandre Barraza is an 64 y.o. male presenting for   Chief Complaint   Patient presents with    Dizziness        Diagnoses as of 03/19/25 2223   Pelvic abscess in male (Multi)       Medical Decision Making  Received patient in signout at this time. Patient is admitted to [facility] for intraabdominal pelvis mass s/p radical prostatectomy. Boarding in the emergency department at this time pending bed availability secondary to high patient volumes.  No acute concerns were vocalized and no significant events occurred while under my care.      Final diagnoses:   [K65.1] Pelvic abscess in male (Multi)           Procedure  Procedures    Yanira Brennan MD

## 2025-03-20 NOTE — CARE PLAN
The patient's goals for the shift include  remain free from fever    The clinical goals for the shift include Patient will remain free from falls/injury throughout shift

## 2025-03-20 NOTE — PROGRESS NOTES
03/20/25 0852   Discharge Planning   Living Arrangements Spouse/significant other   Support Systems Spouse/significant other;Family members   Assistance Needed none   Type of Residence Private residence   Number of Stairs to Enter Residence 0   Number of Stairs Within Residence 20   Do you have animals or pets at home? No   Who is requesting discharge planning? Provider   Home or Post Acute Services None   Expected Discharge Disposition Home   Does the patient need discharge transport arranged? No   Financial Resource Strain   How hard is it for you to pay for the very basics like food, housing, medical care, and heating? Not hard   Housing Stability   In the last 12 months, was there a time when you were not able to pay the mortgage or rent on time? N   In the past 12 months, how many times have you moved where you were living? 0   At any time in the past 12 months, were you homeless or living in a shelter (including now)? N   Transportation Needs   In the past 12 months, has lack of transportation kept you from medical appointments or from getting medications? no   In the past 12 months, has lack of transportation kept you from meetings, work, or from getting things needed for daily living? No     I met with patient at bedside and explained tcc role. He lives in a condo with his partner Hakan, ambulates independently and drives. Admitted with pelvic abscess, on IV abx, ID consulted. IR consulted for drain placement. TCC to continue to follow for dc needs.

## 2025-03-20 NOTE — CONSULTS
Consults    blayne For Consult  Abdominal abscess    History Of Present Illness  Alexandre Barraza is a 64 y.o. male presenting with chills and lightheadedness. He has a remote history of radical prostatectomy with lymph note resection in November. He recovered well, denies abdominal pain. He does note worsening low back pain for the past month. He is quite active, but notes his back pain limited his ability to walk long distances. Workup in the ED included WBC 11.3. CT A/P with two rim enhancing fluid collections for which IR was consulted for drainage.      Past Medical History  He has a past medical history of Prostate cancer (Multi).    Surgical History  He has a past surgical history that includes Tonsillectomy (02/04/2014).     Social History  He reports that he has quit smoking. His smoking use included cigarettes. He started smoking about 41 years ago. He has a 6 pack-year smoking history. He has never used smokeless tobacco. He reports current alcohol use. He reports that he does not currently use drugs.    Family History  No family history on file.     Allergies  Patient has no known allergies.    MEDS:    Current Facility-Administered Medications:     acetaminophen (Tylenol) tablet 975 mg, 975 mg, oral, q8h PRN, 975 mg at 03/20/25 0811 **OR** acetaminophen (Tylenol) oral liquid 650 mg, 650 mg, oral, q6h PRN **OR** acetaminophen (Tylenol) suppository 650 mg, 650 mg, rectal, q4h PRN, William Cadena MD    calcium carbonate (Tums) chewable tablet 500 mg, 500 mg, oral, Daily, Vickeynia German, DO, 500 mg at 03/20/25 0811    enoxaparin (Lovenox) syringe 40 mg, 40 mg, subcutaneous, q24h, William Cadena MD    melatonin tablet 6 mg, 6 mg, oral, Nightly, William Cadena MD    ondansetron (Zofran) tablet 4 mg, 4 mg, oral, q8h PRN **OR** ondansetron (Zofran) injection 4 mg, 4 mg, intravenous, q8h PRN, William Cadena MD    piperacillin-tazobactam (Zosyn) 3.375 g in dextrose (iso) IV 50 mL, 3.375 g, intravenous,  q8h, William Cadena MD, Last Rate: 0 mL/hr at 03/20/25 0325, 3.375 g at 03/20/25 1035    polyethylene glycol (Glycolax, Miralax) packet 17 g, 17 g, oral, Daily, William Cadena MD    vancomycin (Vancocin) pharmacy to dose - pharmacy monitoring, , miscellaneous, Daily PRN, William Cadena MD    vancomycin 1,750 mg in dextrose 5% 500 mL IV, 1,750 mg, intravenous, q24h, Mecca Ordaz, CedricD    Review of Systems  History obtained from chart review and the patient  General ROS: positive for  - chills  Respiratory ROS: no cough, shortness of breath, or wheezing  Cardiovascular ROS: no chest pain or dyspnea on exertion  Gastrointestinal ROS: no abdominal pain, change in bowel habits, or black or bloody stools  Genitourinary ROS: no dysuria, trouble voiding, or hematuria  Musculoskeletal ROS: positive for - pain in back - lower     Last Recorded Vitals  /79 (BP Location: Right arm, Patient Position: Lying)   Pulse 91   Temp 37.3 °C (99.1 °F) (Temporal)   Resp 17   Wt 84.1 kg (185 lb 6.5 oz)   SpO2 94%      Physical Exam  Orientation: oriented to person, place, time, and general circumstances  HEENT: normocephalic, atraumatic  Pulm: normal  Cardiac: Regular rate and rhythm  GI: soft, nontender, normal bowel sounds  Pulses: peripheral pulses symmetrical    Relevant Results    LABS:  Lab Results   Component Value Date    WBC 11.3 03/19/2025    HGB 15.6 03/19/2025    HCT 44.9 03/19/2025    MCV 84 03/19/2025     03/19/2025      Results from last 72 hours   Lab Units 03/20/25  0440   SODIUM mmol/L 137   POTASSIUM mmol/L 4.0   CHLORIDE mmol/L 104   CO2 mmol/L 27   BUN mg/dL 16   CREATININE mg/dL 0.99   GLUCOSE mg/dL 111*   CALCIUM mg/dL 8.4*   ANION GAP mmol/L 10   EGFR mL/min/1.73m*2 86     Results from last 72 hours   Lab Units 03/19/25  1628   ALK PHOS U/L 55   BILIRUBIN TOTAL mg/dL 1.1   PROTEIN TOTAL g/dL 8.9*   ALT U/L 13   AST U/L 16   ALBUMIN g/dL 4.8     Results from last 72 hours   Lab Units  03/20/25  0440   INR  1.3*       MICRO:  No results found for the last 14 days.      IMAGING:   CT head wo IV contrast   Final Result   1. No acute intracranial hemorrhage or mass effect.   2. Patchy white matter hypodensities which are nonspecific but likely   related to microangiopathic white matter changes.        Signed by: Titi Rodriguez 3/19/2025 6:18 PM   Dictation workstation:   [id number]      CT abdomen pelvis w IV contrast   Final Result   1. Bilateral pelvic sidewall extraperitoneal rim enhancing fluid   collections along the inferior margin of the external iliac veins,   likely abscesses.   2. Mild diffuse bladder wall thickening may reflect mild cystitis.   3. Postsurgical change related to prostatectomy.        Signed by: Titi Rodriguez 3/19/2025 6:30 PM   Dictation workstation:   [id number]      XR chest 2 views   Final Result   1.  No evidence of acute cardiopulmonary process.                  MACRO:   None        Signed by: Joseph Schoenberger 3/19/2025 4:43 PM   Dictation workstation:   [id number]      CT guided abscess fluid collection drainage visceral Perq    (Results Pending)          Assessment/Plan     This is a 64 year old male presenting with chills and lightheadedness. Remote history of radical prostatectomy with lymph note resection in November. Complains of low back pain and sudden onset chills. Workup in the ED included WBC 11.3. CT A/P was personally reviewed and discussed with IR attending. Imaging demonstrates a rim enhancing fluid collection in the right pelvic sidewall, small at only 3.3cm, as well as a 5.4cm left anterior pelvic collection concerning for abscess. Fluid collections appear amenable to percutaneous drainage.     Treatment options were discussed with the patient. Risks of a percutaneous drain insertion were reviewed including but not limited to bleeding, infection, or damage to surrounding structures were reviewed. Benefits of the procedure and  alternatives to treatment were also reviewed. Patient verbalizes understanding and wishes to proceed. They will further discuss with IR attending prior to procedure.     Will plan for CT guided abscess drains x2.     Sedation Plan    ASA 2     Mallampati class: II.      NPO since midnight.           Monserrat Pennington, APRN-CNP

## 2025-03-20 NOTE — ED PROVIDER NOTES
HPI   Chief Complaint   Patient presents with    Dizziness       HPI: 64-year-old male with no significant reported past medical history presents for fever and chills.  He says that earlier today he was in the shower when he began having chills.  He slept most of the afternoon and came to the ED because he felt persistently lightheaded.  He denies cough, chest pain, shortness of breath, rash, dysuria and sick contacts.      Limitations to history: None  Independent Historians: Patient  External Records Reviewed: HIE, outpatient notes, inpatient notes  ------------------------------------------------------------------------------------------------------------------------------------------  ROS: a ten point review of systems was performed and was negative except as per HPI.  ------------------------------------------------------------------------------------------------------------------------------------------  PMH / PSH: as per HPI, otherwise reviewed in EMR  MEDS: as per HPI, otherwise reviewed in EMR  ALLERGIES: as per HPI, otherwise reviewed in EMR  SocH:  as per HPI, otherwise reviewed in EMR  FH:  as per HPI, otherwise reviewed in EMR  ------------------------------------------------------------------------------------------------------------------------------------------  Physical Exam:  VS: As documented in the triage note and EMR flowsheet from this visit was reviewed  General: Well appearing. No acute distress.   Eyes:  Extraocular movements grossly intact. No scleral icterus. No discharge  HEENT:  Normocephalic.  Atraumatic  Neck: Moves neck freely. No gross masses  CV: Regular rhythm. No murmurs, rubs or gallops   Resp: Clear to auscultation bilaterally. No respiratory distress.    GI: Soft, no masses, nontender. No rebound tenderness or guarding  MSK: Symmetric muscle bulk. No deformities. No lower extremity edema.    Skin: Warm, dry, intact.   Neuro: No focal deficits.  A&O x3.   Psych: Appropriate for  situation  ------------------------------------------------------------------------------------------------------------------------------------------  Hospital Course / Medical Decision Making:  Independent Interpretations: ***  EKG as interpreted by me: ***    MDM: ***    Discussion of Management with Other Providers:   I discussed the patient/results with: Emergency medicine team    Final diagnosis and disposition as below.    Results for orders placed or performed during the hospital encounter of 03/19/25  -Urinalysis with Reflex Culture and Microscopic:   Collection Time: 03/19/25  4:15 PM       Result                      Value             Ref Range           Color, Urine                Light-Yellow      Light-Yellow*       Appearance, Urine           Clear             Clear               Specific Gravity, Urine     >1.050 (N)        1.005 - 1.035       pH, Urine                   6.0               5.0, 5.5, 6.*       Protein, Urine              NEGATIVE          NEGATIVE, 10*       Glucose, Urine              Normal            Normal mg/dL        Blood, Urine                NEGATIVE          NEGATIVE mg/*       Ketones, Urine              NEGATIVE          NEGATIVE mg/*       Bilirubin, Urine            NEGATIVE          NEGATIVE mg/*       Urobilinogen, Urine         Normal            Normal mg/dL        Nitrite, Urine              NEGATIVE          NEGATIVE            Leukocyte Esterase, Ur*     NEGATIVE          NEGATIVE       -ECG 12 Lead:   Collection Time: 03/19/25  4:15 PM       Result                      Value             Ref Range           Ventricular Rate            107               BPM                 Atrial Rate                 107               BPM                 OR Interval                 130               ms                  QRS Duration                94                ms                  QT Interval                 378               ms                  QTC Calculation(Bazett)     504                ms                  P Axis                      73                degrees             R Axis                      74                degrees             T Axis                      63                degrees             QRS Count                   18                beats               Q Onset                     217               ms                  P Onset                     152               ms                  P Offset                    206               ms                  T Offset                    406               ms                  QTC Fredericia              458               ms             -Sars-CoV-2 and Influenza A/B PCR:   Collection Time: 03/19/25  4:28 PM       Result                      Value             Ref Range           Flu A Result                Not Detected      Not Detected        Flu B Result                Not Detected      Not Detected        Coronavirus 2019, PCR       Not Detected      Not Detected   -B-Type Natriuretic Peptide:   Collection Time: 03/19/25  4:28 PM       Result                      Value             Ref Range           BNP                         94                0 - 99 pg/mL   -Troponin I, High Sensitivity:   Collection Time: 03/19/25  4:28 PM       Result                      Value             Ref Range           Troponin I, High Sensi*     8                 0 - 20 ng/L    -Comprehensive Metabolic Panel:   Collection Time: 03/19/25  4:28 PM       Result                      Value             Ref Range           Glucose                     113 (H)           74 - 99 mg/dL       Sodium                      133 (L)           136 - 145 mm*       Potassium                   3.7               3.5 - 5.3 mm*       Chloride                    98                98 - 107 mmo*       Bicarbonate                 24                21 - 32 mmol*       Anion Gap                   15                10 - 20 mmol*       Urea Nitrogen               13                6 - 23  mg/dL        Creatinine                  1.01              0.50 - 1.30 *       eGFR                        84                >60 mL/min/1*       Calcium                     9.5               8.6 - 10.3 m*       Albumin                     4.8               3.4 - 5.0 g/*       Alkaline Phosphatase        55                33 - 136 U/L        Total Protein               8.9 (H)           6.4 - 8.2 g/*       AST                         16                9 - 39 U/L          Bilirubin, Total            1.1               0.0 - 1.2 mg*       ALT                         13                10 - 52 U/L    -Magnesium:   Collection Time: 03/19/25  4:28 PM       Result                      Value             Ref Range           Magnesium                   1.57 (L)          1.60 - 2.40 *  -CBC and Auto Differential:   Collection Time: 03/19/25  4:28 PM       Result                      Value             Ref Range           WBC                         11.3              4.4 - 11.3 x*       nRBC                        0.0               0.0 - 0.0 /1*       RBC                         5.37              4.50 - 5.90 *       Hemoglobin                  15.6              13.5 - 17.5 *       Hematocrit                  44.9              41.0 - 52.0 %       MCV                         84                80 - 100 fL         MCH                         29.1              26.0 - 34.0 *       MCHC                        34.7              32.0 - 36.0 *       RDW                         13.3              11.5 - 14.5 %       Platelets                   236               150 - 450 x1*       Neutrophils %               86.5              40.0 - 80.0 %       Immature Granulocytes *     0.4               0.0 - 0.9 %         Lymphocytes %               7.0               13.0 - 44.0 %       Monocytes %                 5.8               2.0 - 10.0 %        Eosinophils %               0.0               0.0 - 6.0 %         Basophils %                 0.3                0.0 - 2.0 %         Neutrophils Absolute        9.78 (H)          1.20 - 7.70 *       Immature Granulocytes *     0.04              0.00 - 0.70 *       Lymphocytes Absolute        0.79 (L)          1.20 - 4.80 *       Monocytes Absolute          0.65              0.10 - 1.00 *       Eosinophils Absolute        0.00              0.00 - 0.70 *       Basophils Absolute          0.03              0.00 - 0.10 *  -Lactate:   Collection Time: 03/19/25  4:28 PM       Result                      Value             Ref Range           Lactate                     1.6               0.4 - 2.0 mm*  -C-reactive protein:   Collection Time: 03/19/25  4:28 PM       Result                      Value             Ref Range           C-Reactive Protein          9.19 (H)          <1.00 mg/dL    CT head wo IV contrast   Final Result    1. No acute intracranial hemorrhage or mass effect.    2. Patchy white matter hypodensities which are nonspecific but likely    related to microangiopathic white matter changes.          Signed by: Titi Rodriguez 3/19/2025 6:18 PM    Dictation workstation:   [id number]     CT abdomen pelvis w IV contrast   Final Result    1. Bilateral pelvic sidewall extraperitoneal rim enhancing fluid    collections along the inferior margin of the external iliac veins,    likely abscesses.    2. Mild diffuse bladder wall thickening may reflect mild cystitis.    3. Postsurgical change related to prostatectomy.          Signed by: Titi Rodriguez 3/19/2025 6:30 PM    Dictation workstation:   [id number]     XR chest 2 views   Final Result    1.  No evidence of acute cardiopulmonary process.                      MACRO:    None          Signed by: Joseph Schoenberger 3/19/2025 4:43 PM    Dictation workstation:   [id number]                   Patient History   History reviewed. No pertinent past medical history.  Past Surgical History:   Procedure Laterality Date    TONSILLECTOMY  02/04/2014    Tonsillectomy  With Adenoidectomy     No family history on file.  Social History     Tobacco Use    Smoking status: Unknown    Smokeless tobacco: Not on file   Substance Use Topics    Alcohol use: Not on file    Drug use: Not on file       Physical Exam   ED Triage Vitals [03/19/25 1607]   Temperature Heart Rate Respirations BP   (!) 39.2 °C (102.6 °F) (!) 105 18 123/77      Pulse Ox Temp Source Heart Rate Source Patient Position   95 % Temporal Monitor --      BP Location FiO2 (%)     -- --       Physical Exam      ED Course & MDM   Diagnoses as of 03/19/25 2219   Pelvic abscess in male (Multi)                 No data recorded     Aleksandra Coma Scale Score: 15 (03/19/25 1943 : Tamera Cuellar, RN)       NIH Stroke Scale: 0 (03/19/25 1943 : Tamera Cuellar, RN)                   Medical Decision Making      Procedure  Procedures

## 2025-03-20 NOTE — CONSULTS
"INFECTIOUS DISEASE INPATIENT INITIAL CONSULTATION    Referred By: William Cadena    Reason For Consult: abscess     HPI:  This is a 64 y.o. male with PMH of prostate CA s/p prostatectomy 11/2024 who presented with chills.    Fever 102.6 here. WBC normal but with left shift. UA without pyuria. Blood cx pending. CT with intra-abd abscesses.     Allergies:  Patient has no known allergies.     Vitals (Last 24 Hours):  Heart Rate:  []   Temp:  [36.1 °C (97 °F)-39.2 °C (102.6 °F)]   Resp:  [15-18]   BP: (102-141)/(66-99)   Height:  [182.9 cm (6')-182.9 cm (6' 0.01\")]   Weight:  [83.9 kg (185 lb)-84.1 kg (185 lb 6.5 oz)]   SpO2:  [94 %-98 %]      PHYSICAL EXAM:  Gen - NAD  Heart - RRR, no murmurs  Lungs - clear bilaterally, no wheezing  Abd - soft, no ttp, BS present  Ext - no LE edema  Skin - no rash    MEDS:    Current Facility-Administered Medications:     acetaminophen (Tylenol) tablet 975 mg, 975 mg, oral, q8h PRN, 975 mg at 03/20/25 0811 **OR** acetaminophen (Tylenol) oral liquid 650 mg, 650 mg, oral, q6h PRN **OR** acetaminophen (Tylenol) suppository 650 mg, 650 mg, rectal, q4h PRN, William Cadena MD    calcium carbonate (Tums) chewable tablet 500 mg, 500 mg, oral, Daily, Vickeysonny German, DO, 500 mg at 03/20/25 0811    enoxaparin (Lovenox) syringe 40 mg, 40 mg, subcutaneous, q24h, William Cadena MD    melatonin tablet 6 mg, 6 mg, oral, Nightly, William Cadena MD    ondansetron (Zofran) tablet 4 mg, 4 mg, oral, q8h PRN **OR** ondansetron (Zofran) injection 4 mg, 4 mg, intravenous, q8h PRN, William Cadena MD    piperacillin-tazobactam (Zosyn) 3.375 g in dextrose (iso) IV 50 mL, 3.375 g, intravenous, q8h, William Cadena MD, Stopped at 03/20/25 1144    polyethylene glycol (Glycolax, Miralax) packet 17 g, 17 g, oral, Daily, William Cadnea MD    vancomycin (Vancocin) pharmacy to dose - pharmacy monitoring, , miscellaneous, Daily PRN, William Cadena MD    vancomycin 1,750 mg in dextrose 5% 500 mL IV, " 1,750 mg, intravenous, q24h, Mecca Ordaz, Beni     LABS:  Lab Results   Component Value Date    WBC 11.3 03/19/2025    HGB 15.6 03/19/2025    HCT 44.9 03/19/2025    MCV 84 03/19/2025     03/19/2025      Results from last 72 hours   Lab Units 03/20/25  0440   SODIUM mmol/L 137   POTASSIUM mmol/L 4.0   CHLORIDE mmol/L 104   CO2 mmol/L 27   BUN mg/dL 16   CREATININE mg/dL 0.99   GLUCOSE mg/dL 111*   CALCIUM mg/dL 8.4*   ANION GAP mmol/L 10   EGFR mL/min/1.73m*2 86     Results from last 72 hours   Lab Units 03/19/25  1628   ALK PHOS U/L 55   BILIRUBIN TOTAL mg/dL 1.1   PROTEIN TOTAL g/dL 8.9*   ALT U/L 13   AST U/L 16   ALBUMIN g/dL 4.8     Estimated Creatinine Clearance: 84.9 mL/min (by C-G formula based on SCr of 0.99 mg/dL).    C-Reactive Protein   Date/Time Value Ref Range Status   03/19/2025 04:28 PM 9.19 (H) <1.00 mg/dL Final     IMAGING:  CT A/P 3/19  Impression:     1. Bilateral pelvic sidewall extraperitoneal rim enhancing fluid  collections along the inferior margin of the external iliac veins,  likely abscesses.  2. Mild diffuse bladder wall thickening may reflect mild cystitis.  3. Postsurgical change related to prostatectomy.     CXR 3/19  Impression:     1.  No evidence of acute cardiopulmonary process.     ASSESSMENT/PLAN:    Intra-Abd Abscesses    Planned for IR drainage. IV Zosyn. Stopped IV Vanc.    Monitoring for adverse effects of abx such as rash/itching/diarrhea.    Will follow. Thanks!    Ovi Metz MD  [facility]  Office #[phone]

## 2025-03-21 LAB
ALBUMIN SERPL BCP-MCNC: 3.6 G/DL (ref 3.4–5)
ANION GAP SERPL CALC-SCNC: 10 MMOL/L (ref 10–20)
BUN SERPL-MCNC: 12 MG/DL (ref 6–23)
CALCIUM SERPL-MCNC: 8.4 MG/DL (ref 8.6–10.3)
CHLORIDE SERPL-SCNC: 104 MMOL/L (ref 98–107)
CO2 SERPL-SCNC: 26 MMOL/L (ref 21–32)
CREAT SERPL-MCNC: 0.85 MG/DL (ref 0.5–1.3)
EGFRCR SERPLBLD CKD-EPI 2021: >90 ML/MIN/1.73M*2
ERYTHROCYTE [DISTWIDTH] IN BLOOD BY AUTOMATED COUNT: 13.5 % (ref 11.5–14.5)
GLUCOSE SERPL-MCNC: 127 MG/DL (ref 74–99)
HCT VFR BLD AUTO: 37.8 % (ref 41–52)
HGB BLD-MCNC: 13.2 G/DL (ref 13.5–17.5)
MCH RBC QN AUTO: 29.5 PG (ref 26–34)
MCHC RBC AUTO-ENTMCNC: 34.9 G/DL (ref 32–36)
MCV RBC AUTO: 84 FL (ref 80–100)
NRBC BLD-RTO: 0 /100 WBCS (ref 0–0)
PHOSPHATE SERPL-MCNC: 2.8 MG/DL (ref 2.5–4.9)
PLATELET # BLD AUTO: 163 X10*3/UL (ref 150–450)
POTASSIUM SERPL-SCNC: 3.7 MMOL/L (ref 3.5–5.3)
RBC # BLD AUTO: 4.48 X10*6/UL (ref 4.5–5.9)
SODIUM SERPL-SCNC: 136 MMOL/L (ref 136–145)
WBC # BLD AUTO: 10.4 X10*3/UL (ref 4.4–11.3)

## 2025-03-21 PROCEDURE — 1100000001 HC PRIVATE ROOM DAILY

## 2025-03-21 PROCEDURE — 2500000001 HC RX 250 WO HCPCS SELF ADMINISTERED DRUGS (ALT 637 FOR MEDICARE OP): Performed by: INTERNAL MEDICINE

## 2025-03-21 PROCEDURE — 99232 SBSQ HOSP IP/OBS MODERATE 35: CPT | Performed by: INTERNAL MEDICINE

## 2025-03-21 PROCEDURE — 2500000004 HC RX 250 GENERAL PHARMACY W/ HCPCS (ALT 636 FOR OP/ED): Performed by: INTERNAL MEDICINE

## 2025-03-21 PROCEDURE — 2500000005 HC RX 250 GENERAL PHARMACY W/O HCPCS: Performed by: INTERNAL MEDICINE

## 2025-03-21 PROCEDURE — 99233 SBSQ HOSP IP/OBS HIGH 50: CPT | Performed by: NURSE PRACTITIONER

## 2025-03-21 PROCEDURE — 80069 RENAL FUNCTION PANEL: CPT | Performed by: INTERNAL MEDICINE

## 2025-03-21 PROCEDURE — 85027 COMPLETE CBC AUTOMATED: CPT | Performed by: INTERNAL MEDICINE

## 2025-03-21 PROCEDURE — 36415 COLL VENOUS BLD VENIPUNCTURE: CPT | Performed by: INTERNAL MEDICINE

## 2025-03-21 RX ADMIN — PIPERACILLIN SODIUM AND TAZOBACTAM SODIUM 3.38 G: 3; .375 INJECTION, SOLUTION INTRAVENOUS at 02:15

## 2025-03-21 RX ADMIN — PIPERACILLIN SODIUM AND TAZOBACTAM SODIUM 3.38 G: 3; .375 INJECTION, SOLUTION INTRAVENOUS at 11:02

## 2025-03-21 RX ADMIN — CALCIUM CARBONATE (ANTACID) CHEW TAB 500 MG 500 MG: 500 CHEW TAB at 09:05

## 2025-03-21 RX ADMIN — PIPERACILLIN SODIUM AND TAZOBACTAM SODIUM 3.38 G: 3; .375 INJECTION, SOLUTION INTRAVENOUS at 18:47

## 2025-03-21 RX ADMIN — Medication 6 MG: at 20:50

## 2025-03-21 RX ADMIN — ENOXAPARIN SODIUM 40 MG: 40 INJECTION SUBCUTANEOUS at 16:48

## 2025-03-21 ASSESSMENT — COGNITIVE AND FUNCTIONAL STATUS - GENERAL
MOBILITY SCORE: 24
DAILY ACTIVITIY SCORE: 24
MOBILITY SCORE: 24
DAILY ACTIVITIY SCORE: 24

## 2025-03-21 ASSESSMENT — PAIN SCALES - WONG BAKER: WONGBAKER_NUMERICALRESPONSE: NO HURT

## 2025-03-21 ASSESSMENT — PAIN SCALES - GENERAL
PAINLEVEL_OUTOF10: 0 - NO PAIN
PAINLEVEL_OUTOF10: 0 - NO PAIN

## 2025-03-21 NOTE — DISCHARGE INSTRUCTIONS
Freddy-Carr Drain Care  WHAT YOU NEED TO KNOW:    A Freddy-Carr (MARIANA) drain is used to remove fluids that build up in an area of your body after  surgery. The MARIANA drain is a bulb shaped device connected to a tube. One end of the tube is placed  inside you during surgery. The other end comes out through a small cut in your skin. The bulb is  connected to this end. You may have a stitch to hold the tube in place.    DISCHARGE INSTRUCTIONS:  How a Freddy-Carr drain works: The MARIANA drain removes fluids by creating suction in the tube.  The bulb is squeezed flat and connected to the tube that sticks out of your body. The bulb  expands as it fills with fluid.    How to change the bandage around your Freddy-Carr drain: If you have a bandage, change  it once a day. You may need to change your bandage more than once a day if it gets completely  wet.  ? Wash your hands with soap and water.  ? Loosen the tape and gently remove the old bandage. Throw the old bandage into a plastic  trash bag.  ? Use soap and water or saline (salt water) solution to clean your AMRIANA drain site. Dip a cotton  swab or gauze pad in the solution and gently clean your skin.  ? Pat the area dry.  ? Place a new bandage on your MARIANA drain site and secure it to your skin with medical tape.  ? Wash your hands.  How to empty the Freddy-Carr drain: Empty the bulb when it is half full or every 8 to 12  hours.  ? Wash your hands with soap and water.  ? Remove the plug from the bulb.  ? Pour the fluid into a measuring cup.  ? Clean the plug with an alcohol swab or a cotton ball dipped in rubbing alcohol.  ? Squeeze the bulb flat and put the plug back in. The bulb should stay flat until it starts to  fill with fluid again.  ? Measure the amount of fluid you pour out. Write down how much fluid you empty from  the MARIANA drain and the date and time you collected it.  ? Flush the fluid down the toilet. Wash your hands.    Freddy-Carr drain removal: The amount of  fluid that you drain will decrease as your wound  heals. The MARIANA drain usually is removed when less than 30 milliliters (2 tablespoons) is collected  in 24 hours. Ask your healthcare provider when and how your MARIANA drain will be removed.  Follow up with your healthcare provider as directed: Write down your questions so you  remember to ask them during your visits.    Contact your healthcare provider if:  ? You drain less than 30 milliliters (2 tablespoons) in 24 hours. This may mean your drain  can be removed.  ? You suddenly stop draining fluid or think your MARIANA drain is blocked.  ? You have a fever higher than 101.5°F (38.6°C).  ? You have increased pain, redness, or swelling around the drain site.  ? You have questions about your MARIANA drain care.    Seek care immediately if:  ? Your MARIANA drain breaks or comes out.  ? You have cloudy yellow or brown drainage from your MARIANA drain site, or the drainage  smells bad.

## 2025-03-21 NOTE — ED NOTES
Community Resource Name: No primary care physician.  Phone Number:   Staff Member:  Sandra Kearney    Discussed the following topics on behalf of the patient:  []  Behavioral Health Assistance     []  Case Management  []   Assistance  []  Digital Equity Assistance  []  Dental Health Assistance  []  Education Assistance  []  Employment Assistance  []  Financial Strain Relief Assistance  []  Food Insecurity Assistance  [x]  Healthcare Coverage Assistance  []  Housing Stability Assistance  []  IP Violence Relief Assistance  []  Legal Assistance  []  Physical Activity Assistance  []  Social Connection Assistance  []  Stress Relief Assistance   []  Substance Abuse Assistance  []  Transportation Assistance  []  Utility Assistance  [x]  Other: [insert comment here]  Patient does have a PCP. CHW updated the patient chart.  Next Steps:         GUS Parada  Track patients for community healthcare services. CHW talked to patient regarding not having a primary care physician. Patient expressed that he does have a primary care physician from [facility] named Dr. Vern Han and Jose hendrickson. CHW updated the patient chart with the physician name added. Patient expressed appreciation.        GUS Parada  03/21/25 2309

## 2025-03-21 NOTE — PROGRESS NOTES
Subjective   Interval History: has complaints pain at the right drain site. Denies ongoing fever or chills. 30ml out from left drain, 10ml from right drain overnight..     Objective   Vital signs in last 24 hours:  /77 (BP Location: Right arm, Patient Position: Lying)   Pulse 71   Temp 36.6 °C (97.9 °F) (Temporal)   Resp 17   Wt 84.1 kg (185 lb 6.5 oz)   SpO2 97%     Intake/Output last 3 shifts:  I/O last 3 completed shifts:  In: 1550 (18.4 mL/kg) [P.O.:300; I.V.:50 (0.6 mL/kg); IV Piggyback:1200]  Out: 240 (2.9 mL/kg) [Urine:200 (0.1 mL/kg/hr); Drains:40]  Weight: 84.1 kg   Intake/Output this shift:  I/O this shift:  In: 50 [IV Piggyback:50]  Out: -     Physical Exam  Neuro: oriented to person, place, time, and general circumstances  HEENT: normocephalic, atraumatic  Pulm: normal  Cardiac: Regular rate and rhythm  Abdomen: soft, nontender, normal bowel sounds  Pulses: 2+ and symmetric    Relevant Results  LABS:  Lab Results   Component Value Date    WBC 10.4 03/21/2025    HGB 13.2 (L) 03/21/2025    HCT 37.8 (L) 03/21/2025    MCV 84 03/21/2025     03/21/2025      Results from last 72 hours   Lab Units 03/21/25  0451   SODIUM mmol/L 136   POTASSIUM mmol/L 3.7   CHLORIDE mmol/L 104   CO2 mmol/L 26   BUN mg/dL 12   CREATININE mg/dL 0.85   GLUCOSE mg/dL 127*   CALCIUM mg/dL 8.4*   ANION GAP mmol/L 10   EGFR mL/min/1.73m*2 >90     Results from last 72 hours   Lab Units 03/21/25  0451 03/19/25  1628   ALK PHOS U/L  --  55   BILIRUBIN TOTAL mg/dL  --  1.1   PROTEIN TOTAL g/dL  --  8.9*   ALT U/L  --  13   AST U/L  --  16   ALBUMIN g/dL 3.6 4.8     Results from last 72 hours   Lab Units 03/20/25  0440   INR  1.3*       MICRO:  No results found for the last 14 days.      IMAGING:  CT guided abscess fluid collection drainage visceral Perq   Final Result   Uneventful CT guided 10 Armenian pigtail drainage catheter placement of   the left pelvic fluid collection, as detailed above. Uneventful   CT-guided 10  Swedish pigtail drainage catheter placement of the right   pelvic fluid collection, as detailed above.        Performed and dictated at [facility].        MACRO:   None        Signed by: Quincy Ohara 3/21/2025 11:41 AM   Dictation workstation:   [id number]      CT head wo IV contrast   Final Result   1. No acute intracranial hemorrhage or mass effect.   2. Patchy white matter hypodensities which are nonspecific but likely   related to microangiopathic white matter changes.        Signed by: Titi Rodriguez 3/19/2025 6:18 PM   Dictation workstation:   [id number]      CT abdomen pelvis w IV contrast   Final Result   1. Bilateral pelvic sidewall extraperitoneal rim enhancing fluid   collections along the inferior margin of the external iliac veins,   likely abscesses.   2. Mild diffuse bladder wall thickening may reflect mild cystitis.   3. Postsurgical change related to prostatectomy.        Signed by: Titi Rodriguez 3/19/2025 6:30 PM   Dictation workstation:   [id number]      XR chest 2 views   Final Result   1.  No evidence of acute cardiopulmonary process.                  MACRO:   None        Signed by: Joseph Schoenberger 3/19/2025 4:43 PM   Dictation workstation:   [id number]          Assessment/Plan   This is a 64 year old male presenting with chills and lightheadedness. Remote history of radical prostatectomy with lymph note resection in November. Complains of low back pain and sudden onset chills. Workup in the ED included WBC 11.3. CT A/P was personally reviewed and discussed with IR attending. Imaging demonstrates a rim enhancing fluid collection in the right pelvic sidewall, small at only 3.3cm, as well as a 5.4cm left anterior pelvic collection concerning for abscess. S/p bilateral percutaneous drain placement 3/20/25 with serous output.     Drains flushed with NS, left flushed easily, right with some resistance.   Left drain with 30ml serous  output. Right drain with 10ml serosang output.   Suspect the right sided collection (previously 3cm) has collapsed.   Continue drainage, empty and record output Qshift.   Cultures pending, +rare GPC so far. ID following.     Will reassess drain output on Monday for possible drain removal. Otherwise if discharged this weekend, recommend 1 week CT and followup with IR for drain removal.      LOS: 2 days     Monserrat Pennington, APRN-CNP    ed above.

## 2025-03-21 NOTE — PROGRESS NOTES
03/21/25 0739   Discharge Planning   Home or Post Acute Services None   Expected Discharge Disposition Home     Patient on strict bed rest. Patient had L and R 10 F drain catheters placed yesterday by IR.  Fluid was sent for culture.  Patient receiving IV antibiotics. ID following.  Patient is currently afebrile.  Plan remains for patient to return home upon discharge.  No home going needs identified at this time.  Will continue to follow for discharge planning needs.

## 2025-03-21 NOTE — CARE PLAN
The patient's goals for the shift include      The clinical goals for the shift include pain management        Problem: Pain - Adult  Goal: Verbalizes/displays adequate comfort level or baseline comfort level  Outcome: Progressing     Problem: Safety - Adult  Goal: Free from fall injury  Outcome: Progressing     Problem: Discharge Planning  Goal: Discharge to home or other facility with appropriate resources  Outcome: Progressing     Problem: Chronic Conditions and Co-morbidities  Goal: Patient's chronic conditions and co-morbidity symptoms are monitored and maintained or improved  Outcome: Progressing     Problem: Nutrition  Goal: Nutrient intake appropriate for maintaining nutritional needs  Outcome: Progressing

## 2025-03-21 NOTE — CARE PLAN
The patient's goals for the shift include      The clinical goals for the shift include to be pain free      Problem: Pain - Adult  Goal: Verbalizes/displays adequate comfort level or baseline comfort level  Outcome: Progressing

## 2025-03-21 NOTE — PROGRESS NOTES
Alexandre Barraza is a 64 y.o. male on day 2 of admission presenting with Pelvic abscess in male (Multi).      Subjective   Patient was seen and examined at bedside this morning, stated that she is tolerating his pain, internal drain has been emptied this morning, no blood in the, I report normal bowel without blood as well.  All review of systems patient denies fever, chills, nausea, vomiting, chest pain or shortness of breath       Objective     Last Recorded Vitals  /83 (BP Location: Right arm, Patient Position: Lying)   Pulse 85   Temp 37.1 °C (98.8 °F) (Temporal)   Resp 17   Wt 84.1 kg (185 lb 6.5 oz)   SpO2 98%   Intake/Output last 3 Shifts:    Intake/Output Summary (Last 24 hours) at 3/21/2025 1948  Last data filed at 3/21/2025 1847  Gross per 24 hour   Intake 550 ml   Output 40 ml   Net 510 ml       Admission Weight  Weight: 83.9 kg (185 lb) (03/19/25 1607)    Daily Weight  03/19/25 : 84.1 kg (185 lb 6.5 oz)    Image Results  CT guided abscess fluid collection drainage visceral Perq  Narrative: Interpreted By:  Quincy Ohara,   STUDY:  CT GUIDED ABSCESS FLUID COLLECTION DRAINAGE VISCERAL PERQ;  3/20/2025  4:08 pm      INDICATION:  Signs/Symptoms:request drains for abscesses.      COMPARISON:  None.      ACCESSION NUMBER(S):  [id number]      ORDERING CLINICIAN:  LILA WONG      TECHNIQUE:  INTERVENTIONALIST(S):  Willie Ohara MD      CONSENT:  The patient/patient's POA/next of kin was informed of the nature of  the proposed procedure. The purposes, alternatives, risks, and  benefits were explained and discussed. All questions were answered  and consent was obtained.      RADIATION EXPOSURE:  DLP: 791.3 mGy*cm      SEDATION:  Moderate conscious IV sedation services (supervision of  administration, induction, and maintenance) were provided by the  physician performing the procedure with intravenous fentanyl 100 mcg  and versed 2 mg for 52 minutes. The physician was assisted by  an  independent trained observer, an interventional radiology nurse, in  the continuous monitoring of patient level of consciousness and  physiologic status.      MEDICATION/CONTRAST:  No additional      TIME OUT:  A time out was performed immediately prior to procedure start with  the interventional team, correctly identifying the patient name, date  of birth, MRN, procedure, anatomy (including marking of site and  side), patient position, procedure consent form, relevant laboratory  and imaging test results, antibiotic administration, safety  precautions, and procedure-specific equipment needs.      COMPLICATIONS:  No immediate adverse events identified.      FINDINGS:  Patient was placed in supine position. Limited axial CT images were  obtained through the abdomen and pelvis for the purposes of needle  guidance were taken. The images demonstrate the previously described  fluid collections within the right and left pelvis. Percutaneous  entry points for access into these 2 collections was identified.  Patient was prepped and draped in the usual sterile fashion.      Attention was 1st turned to drainage of the left pelvic collection.  Lidocaine was used for local anesthesia. Utilizing intermittent CT  guidance a 19G Yueh needle was used to access the fluid collection. A  035 wire was then used to maintain access in the collection after  removal of the Yueh needle. Serial dilatation was had over the wire  and an eventual 10 [place] pigtail drainage catheter was placed in the  collection. The pigtail drain was formed, connected to drain, and  sutured/secured in place. Proximally 30 cc of clear yellow fluid was  sent for analysis.      Attention was next turned to drainage of the right pelvic collection.  Lidocaine was used for local anesthesia. Utilizing intermittent CT  guidance a 19G Yueh needle was used to access the fluid collection. A  035 wire was then used to maintain access in the collection after  removal of  the Yueh needle. Serial dilatation was had over the wire  and an eventual 10 [place] pigtail drainage catheter was placed in the  collection. The pigtail drain was formed, connected to drain, and  sutured/secured in place. Proximally 10 cc of clear yellow fluid was  sent for analysis.      Postprocedure images demonstrated no evidence of hemorrhage and  near-complete resolution of the bilateral pelvic fluid collections.  The patient tolerated the procedure well without any immediate  complications.      Impression: Uneventful CT guided 10 [place] pigtail drainage catheter placement of  the left pelvic fluid collection, as detailed above. Uneventful  CT-guided 10 [place] pigtail drainage catheter placement of the right  pelvic fluid collection, as detailed above.      Performed and dictated at [facility].      MACRO:  None      Signed by: Quincy Ohara 3/21/2025 11:41 AM  Dictation workstation:   [id number]      Physical Exam  Constitutional: Alert active, cooperative not in acute distress  Eyes: PERRLA, clear sclera  ENMT: Moist mucosal membranes, no exudate  Head / Neck: Atraumatic, normocephalic, supple neck, JVP not visualized  Lungs: Patent airways, CTABL  Heart: RRR, S1S2, no murmurs appreciated, palpable pulses in all extremities  GI: Soft, NT, ND, bowel sounds present in all quadrants.  MARIANA drain bilaterally in the low back draining seropurulent fluid  MSK: Moves all extremities freely, no restriction  of ROM, no joint edema  Extremities: Intact x 4, no peripheral edema  : No Croft catheter inserted  Breast: Deferred  Neurological: AAO x 3 to person, place and date, facial muscles symmetrical, sensation intact, strength 4/4, no acute focal neurological deficits appreciated  Psychological: Appropriate mood and behavior    Relevant Results           Scheduled medications  calcium carbonate, 500 mg, oral, Daily  enoxaparin, 40 mg, subcutaneous, q24h  melatonin, 6 mg, oral,  Nightly  piperacillin-tazobactam, 3.375 g, intravenous, q8h  polyethylene glycol, 17 g, oral, Daily      Continuous medications     PRN medications  PRN medications: acetaminophen **OR** acetaminophen **OR** acetaminophen, morphine, ondansetron **OR** ondansetron      Assessment/Plan     64 y.o. male presenting with chills suddenly developed chills, lightheadedness, and nausea. He had to go lie down. He then came to the ED. He denies any pelvic pain or urinary issues of late. He had radical prostatectomy with lymph node resection back in November; there was no cancer in his lymph nodes; he has been cancer free since then. Work-up in ED shows 2 pelvic abscesses.         Pelvic mass  -CT abdomen pelvis shows bilateral pelvic sidewall extraperitoneal rim-enhancing fluid collection alongside the inferior margin of the external iliac vein, likely abscess  - Zosyn 3.375 g IVPB q6h    -General Surgery consulted  -IR consulted status post drain placement uneventfully  -ID consult: Recommend continuing antibiotic as above, monitor for side effects  -Fluid Gram stain shows gram-positive cocci    Diet  -Regular    DVT prophylaxis  -Lovenox 40 mg subcu daily    Disposition: Presenting with pelvic pain, diagnosed with bilateral pelvic abscesses, need further management, discharge pending clinical stability              Vickey German DO

## 2025-03-21 NOTE — PROGRESS NOTES
"  INFECTIOUS DISEASE DAILY PROGRESS NOTE    SUBJECTIVE:    No overnight events. No new complaints. Afebrile. No rash/itching/diarrhea. Drains placed yesterday.    OBJECTIVE:  VITALS (Last 24 Hours)  /77 (BP Location: Right arm, Patient Position: Lying)   Pulse 71   Temp 36.6 °C (97.9 °F) (Temporal)   Resp 17   Ht 1.829 m (6' 0.01\")   Wt 84.1 kg (185 lb 6.5 oz)   SpO2 97%   BMI 25.14 kg/m²     PHYSICAL EXAM:  Gen - NAD  Lungs - clear bilaterally, no wh  Abd - soft, no ttp, 2x MARIANA drains  Skin - no rash    ABX: IV Zosyn    LABS:  Lab Results   Component Value Date    WBC 10.4 03/21/2025    HGB 13.2 (L) 03/21/2025    HCT 37.8 (L) 03/21/2025    MCV 84 03/21/2025     03/21/2025     Lab Results   Component Value Date    GLUCOSE 127 (H) 03/21/2025    CALCIUM 8.4 (L) 03/21/2025     03/21/2025    K 3.7 03/21/2025    CO2 26 03/21/2025     03/21/2025    BUN 12 03/21/2025    CREATININE 0.85 03/21/2025     Results from last 72 hours   Lab Units 03/21/25  0451 03/19/25  1628   ALK PHOS U/L  --  55   BILIRUBIN TOTAL mg/dL  --  1.1   PROTEIN TOTAL g/dL  --  8.9*   ALT U/L  --  13   AST U/L  --  16   ALBUMIN g/dL 3.6 4.8     Estimated Creatinine Clearance: 98.9 mL/min (by C-G formula based on SCr of 0.85 mg/dL).    ASSESSMENT/PLAN:     Intra-Abd Abscesses - s/p drain placement. Gram stain with GPC.    IV Zosyn. Will adjust abx based on cx.    Monitoring for adverse effects of abx such as rash/itching/diarrhea - none.     Will follow peripherally over the weekend. Please call Dr. Novak with questions. Thanks!    Ovi Metz MD  [facility]  Office #[phone]      "

## 2025-03-22 LAB
ALBUMIN SERPL BCP-MCNC: 3.7 G/DL (ref 3.4–5)
ANION GAP SERPL CALC-SCNC: 10 MMOL/L (ref 10–20)
BUN SERPL-MCNC: 11 MG/DL (ref 6–23)
CALCIUM SERPL-MCNC: 8.7 MG/DL (ref 8.6–10.3)
CHLORIDE SERPL-SCNC: 105 MMOL/L (ref 98–107)
CO2 SERPL-SCNC: 25 MMOL/L (ref 21–32)
CREAT SERPL-MCNC: 0.78 MG/DL (ref 0.5–1.3)
EGFRCR SERPLBLD CKD-EPI 2021: >90 ML/MIN/1.73M*2
ERYTHROCYTE [DISTWIDTH] IN BLOOD BY AUTOMATED COUNT: 13.3 % (ref 11.5–14.5)
GLUCOSE SERPL-MCNC: 120 MG/DL (ref 74–99)
HCT VFR BLD AUTO: 38.8 % (ref 41–52)
HGB BLD-MCNC: 13.5 G/DL (ref 13.5–17.5)
MCH RBC QN AUTO: 29.3 PG (ref 26–34)
MCHC RBC AUTO-ENTMCNC: 34.8 G/DL (ref 32–36)
MCV RBC AUTO: 84 FL (ref 80–100)
NRBC BLD-RTO: 0 /100 WBCS (ref 0–0)
PHOSPHATE SERPL-MCNC: 3.1 MG/DL (ref 2.5–4.9)
PLATELET # BLD AUTO: 179 X10*3/UL (ref 150–450)
POTASSIUM SERPL-SCNC: 3.3 MMOL/L (ref 3.5–5.3)
RBC # BLD AUTO: 4.61 X10*6/UL (ref 4.5–5.9)
SODIUM SERPL-SCNC: 137 MMOL/L (ref 136–145)
WBC # BLD AUTO: 5.3 X10*3/UL (ref 4.4–11.3)

## 2025-03-22 PROCEDURE — 2500000005 HC RX 250 GENERAL PHARMACY W/O HCPCS: Performed by: INTERNAL MEDICINE

## 2025-03-22 PROCEDURE — 36415 COLL VENOUS BLD VENIPUNCTURE: CPT | Performed by: INTERNAL MEDICINE

## 2025-03-22 PROCEDURE — 2500000004 HC RX 250 GENERAL PHARMACY W/ HCPCS (ALT 636 FOR OP/ED): Performed by: INTERNAL MEDICINE

## 2025-03-22 PROCEDURE — 99232 SBSQ HOSP IP/OBS MODERATE 35: CPT | Performed by: INTERNAL MEDICINE

## 2025-03-22 PROCEDURE — 85027 COMPLETE CBC AUTOMATED: CPT | Performed by: INTERNAL MEDICINE

## 2025-03-22 PROCEDURE — 80069 RENAL FUNCTION PANEL: CPT | Performed by: INTERNAL MEDICINE

## 2025-03-22 PROCEDURE — 1100000001 HC PRIVATE ROOM DAILY

## 2025-03-22 RX ADMIN — PIPERACILLIN SODIUM AND TAZOBACTAM SODIUM 3.38 G: 3; .375 INJECTION, SOLUTION INTRAVENOUS at 18:07

## 2025-03-22 RX ADMIN — PIPERACILLIN SODIUM AND TAZOBACTAM SODIUM 3.38 G: 3; .375 INJECTION, SOLUTION INTRAVENOUS at 03:59

## 2025-03-22 RX ADMIN — PIPERACILLIN SODIUM AND TAZOBACTAM SODIUM 3.38 G: 3; .375 INJECTION, SOLUTION INTRAVENOUS at 12:22

## 2025-03-22 RX ADMIN — Medication 6 MG: at 20:41

## 2025-03-22 ASSESSMENT — COGNITIVE AND FUNCTIONAL STATUS - GENERAL
MOBILITY SCORE: 24
DAILY ACTIVITIY SCORE: 24

## 2025-03-22 ASSESSMENT — PAIN SCALES - GENERAL
PAINLEVEL_OUTOF10: 0 - NO PAIN

## 2025-03-22 ASSESSMENT — PAIN - FUNCTIONAL ASSESSMENT
PAIN_FUNCTIONAL_ASSESSMENT: 0-10
PAIN_FUNCTIONAL_ASSESSMENT: 0-10

## 2025-03-22 NOTE — CARE PLAN
The patient's goals for the shift include      The clinical goals for the shift include pain management      Problem: Pain - Adult  Goal: Verbalizes/displays adequate comfort level or baseline comfort level  Outcome: Progressing

## 2025-03-22 NOTE — PROGRESS NOTES
Alexandre Barraza is a 64 y.o. male on day 3 of admission presenting with Pelvic abscess in male (Multi).      Subjective   Patient was seen and examined bedside this morning, was happy about being transferred from of the unit, and no review of systems denies having any other symptom other than mild pelvic pain tolerable with current pain management.       Objective     Last Recorded Vitals  /77 (BP Location: Right arm, Patient Position: Lying)   Pulse 76   Temp 36.6 °C (97.8 °F) (Temporal)   Resp 18   Wt 84.1 kg (185 lb 6.5 oz)   SpO2 99%   Intake/Output last 3 Shifts:    Intake/Output Summary (Last 24 hours) at 3/22/2025 1753  Last data filed at 3/22/2025 1700  Gross per 24 hour   Intake 390 ml   Output 20 ml   Net 370 ml       Admission Weight  Weight: 83.9 kg (185 lb) (03/19/25 1607)    Daily Weight  03/19/25 : 84.1 kg (185 lb 6.5 oz)    Image Results  CT guided abscess fluid collection drainage visceral Perq  Narrative: Interpreted By:  Quincy Ohara,   STUDY:  CT GUIDED ABSCESS FLUID COLLECTION DRAINAGE VISCERAL PERQ;  3/20/2025  4:08 pm      INDICATION:  Signs/Symptoms:request drains for abscesses.      COMPARISON:  None.      ACCESSION NUMBER(S):  [id number]      ORDERING CLINICIAN:  ILLA WONG      TECHNIQUE:  INTERVENTIONALIST(S):  Willie Ohara MD      CONSENT:  The patient/patient's POA/next of kin was informed of the nature of  the proposed procedure. The purposes, alternatives, risks, and  benefits were explained and discussed. All questions were answered  and consent was obtained.      RADIATION EXPOSURE:  DLP: 791.3 mGy*cm      SEDATION:  Moderate conscious IV sedation services (supervision of  administration, induction, and maintenance) were provided by the  physician performing the procedure with intravenous fentanyl 100 mcg  and versed 2 mg for 52 minutes. The physician was assisted by an  independent trained observer, an interventional radiology nurse, in  the continuous  monitoring of patient level of consciousness and  physiologic status.      MEDICATION/CONTRAST:  No additional      TIME OUT:  A time out was performed immediately prior to procedure start with  the interventional team, correctly identifying the patient name, date  of birth, MRN, procedure, anatomy (including marking of site and  side), patient position, procedure consent form, relevant laboratory  and imaging test results, antibiotic administration, safety  precautions, and procedure-specific equipment needs.      COMPLICATIONS:  No immediate adverse events identified.      FINDINGS:  Patient was placed in supine position. Limited axial CT images were  obtained through the abdomen and pelvis for the purposes of needle  guidance were taken. The images demonstrate the previously described  fluid collections within the right and left pelvis. Percutaneous  entry points for access into these 2 collections was identified.  Patient was prepped and draped in the usual sterile fashion.      Attention was 1st turned to drainage of the left pelvic collection.  Lidocaine was used for local anesthesia. Utilizing intermittent CT  guidance a 19G Yueh needle was used to access the fluid collection. A  035 wire was then used to maintain access in the collection after  removal of the Yueh needle. Serial dilatation was had over the wire  and an eventual 10 [place] pigtail drainage catheter was placed in the  collection. The pigtail drain was formed, connected to drain, and  sutured/secured in place. Proximally 30 cc of clear yellow fluid was  sent for analysis.      Attention was next turned to drainage of the right pelvic collection.  Lidocaine was used for local anesthesia. Utilizing intermittent CT  guidance a 19G Yueh needle was used to access the fluid collection. A  035 wire was then used to maintain access in the collection after  removal of the Yueh needle. Serial dilatation was had over the wire  and an eventual 10 [place]  pigtail drainage catheter was placed in the  collection. The pigtail drain was formed, connected to drain, and  sutured/secured in place. Proximally 10 cc of clear yellow fluid was  sent for analysis.      Postprocedure images demonstrated no evidence of hemorrhage and  near-complete resolution of the bilateral pelvic fluid collections.  The patient tolerated the procedure well without any immediate  complications.      Impression: Uneventful CT guided 10 Citizen of [place] pigtail drainage catheter placement of  the left pelvic fluid collection, as detailed above. Uneventful  CT-guided 10 [place] pigtail drainage catheter placement of the right  pelvic fluid collection, as detailed above.      Performed and dictated at [facility].      MACRO:  None      Signed by: Quincy Ohara 3/21/2025 11:41 AM  Dictation workstation:   [id number]      Physical Exam  Constitutional: Alert active, cooperative not in acute distress  Eyes: PERRLA, clear sclera  ENMT: Moist mucosal membranes, no exudate  Head / Neck: Atraumatic, normocephalic, supple neck, JVP not visualized  Lungs: Patent airways, CTABL  Heart: RRR, S1S2, no murmurs appreciated, palpable pulses in all extremities  GI: Soft, NT, ND, bowel sounds present in all quadrants.  MARIANA drain bilaterally in the low back draining seropurulent fluid  MSK: Moves all extremities freely, no restriction  of ROM, no joint edema  Extremities: Intact x 4, no peripheral edema  : No Croft catheter inserted  Breast: Deferred  Neurological: AAO x 3 to person, place and date, facial muscles symmetrical, sensation intact, strength 4/4, no acute focal neurological deficits appreciated  Psychological: Appropriate mood and behavior  Relevant Results             Scheduled medications  calcium carbonate, 500 mg, oral, Daily  enoxaparin, 40 mg, subcutaneous, q24h  melatonin, 6 mg, oral, Nightly  piperacillin-tazobactam, 3.375 g, intravenous, q8h  polyethylene glycol, 17  g, oral, Daily      Continuous medications     PRN medications  PRN medications: acetaminophen **OR** acetaminophen **OR** acetaminophen, morphine, ondansetron **OR** ondansetron    Assessment/Plan       64 y.o. male presenting with chills suddenly developed chills, lightheadedness, and nausea. He had to go lie down. He then came to the ED. He denies any pelvic pain or urinary issues of late. He had radical prostatectomy with lymph node resection back in November; there was no cancer in his lymph nodes; he has been cancer free since then. Work-up in ED shows 2 pelvic abscesses.        Pelvic mass  -CT abdomen pelvis shows bilateral pelvic sidewall extraperitoneal rim-enhancing fluid collection alongside the inferior margin of the external iliac vein, likely abscess  - Zosyn 3.375 g IVPB q6h    -IR consulted status post drain placement uneventfully  -ID consult: Recommend continuing antibiotic as above, monitor for side effects  -Fluid Gram stain shows gram-positive cocci     Diet  -Regular     DVT prophylaxis  -Lovenox 40 mg subcu daily     Disposition: Presenting with pelvic pain, diagnosed with bilateral pelvic abscesses, need further management, discharge pending clinical stability, and final recommendation by surgery and ID           Vickey German DO

## 2025-03-22 NOTE — DOCUMENTATION CLARIFICATION NOTE
"    PATIENT:               KANA CASTRO  ACCT #:                  [id number]  MRN:                       [id number]  :                       1960  ADMIT DATE:       3/19/2025 5:47 PM  DISCH DATE:  RESPONDING PROVIDER #:        [id number]          PROVIDER RESPONSE TEXT:    Intra-abdominal pelvic abscesses are related to or are a complication of the Prostatectomy/Lymph Node Dissection procedure performed in 2024    CDI QUERY TEXT:    Clarification        Instruction:    Based on your assessment of the patient and the clinical information, please provide the requested documentation by clicking on the appropriate radio button and enter any additional information if prompted.      Question: Please further clarify if a relationship exists between the Intra-abdominal pelvic abscesses and Prostatectomy/Lymph Node Dissection procedure performed in 2024      When answering this query, please exercise your independent professional judgment. The fact that a question is being asked, does not imply that any particular answer is desired or expected.    The patient's clinical indicators include:  Clinical Information:  65 y/o male presents to [facility] c/o dizziness. DX Intra-abdominal pelvic abscesses.      Clinical Indicators:  - 25 ED note per TREVER De Jesus, DO: \"Pelvic abscess in male\"    - 25 H&P note per RAYMOND Cadena MD: \"He had radical prostatectomy with lymph node resection back in November\" / \"Work-up in ED shows 2 pelvic abscesses.\" / \"Pelvic abscess in male I suspect that he had slow lymph leaks since his prostatectomy abscesses are in location of where his lymph nodes were, which then got super-infected\"    - 25 ID note per LINCOLN Metz MD: \"Intra-Abd Abscesses\"      ED: T 39.2, , RR 18, SpO2 95, 123/77   per ED note \"presents for fever and chills. He says that earlier today he was in the shower when he began having chills. He slept most of the afternoon and came to the ED " "because he felt persistently lightheaded.\"    Labs 03/19 - 03/21:  WBC 11.3 - 10.4  Neutrophils Absolute 9.78  CRP 9.19    03/19 CT AP \"IMPRESSION: 1. Bilateral pelvic sidewall extraperitoneal rim enhancing fluid collections along the inferior margin of the external iliac veins, likely abscesses.\"      Treatment: IV Zosyn 03/19 - 03/21, 03/20 IR Procedure - CT guided 10FR catheter placement left side - 30 mL drained and 10FR catheter placement right side - 10 mL drained.      Risk Factors: PMH: Prostate CA  Options provided:  -- Intra-abdominal pelvic abscesses are related to or are a complication of the Prostatectomy/Lymph Node Dissection procedure performed in November 2024  -- Intra-abdominal pelvic abscesses are unrelated to Prostatectomy/Lymph Node Dissection procedure performed in November 2024  -- Other - I will add my own diagnosis  -- Refer to Clinical Documentation Reviewer    Query created by: Rebecca Layne on 3/21/2025 11:02 AM      Electronically signed by:  STEPH EPPS DO 3/22/2025 7:30 AM          "

## 2025-03-22 NOTE — CARE PLAN
The patient's goals for the shift include      The clinical goals for the shift include pain free    Problem: Pain - Adult  Goal: Verbalizes/displays adequate comfort level or baseline comfort level  Outcome: Progressing     Problem: Safety - Adult  Goal: Free from fall injury  Outcome: Progressing     Problem: Discharge Planning  Goal: Discharge to home or other facility with appropriate resources  Outcome: Progressing     Problem: Chronic Conditions and Co-morbidities  Goal: Patient's chronic conditions and co-morbidity symptoms are monitored and maintained or improved  Outcome: Progressing     Problem: Nutrition  Goal: Nutrient intake appropriate for maintaining nutritional needs  Outcome: Progressing

## 2025-03-23 VITALS
DIASTOLIC BLOOD PRESSURE: 63 MMHG | OXYGEN SATURATION: 98 % | SYSTOLIC BLOOD PRESSURE: 119 MMHG | HEIGHT: 72 IN | RESPIRATION RATE: 18 BRPM | BODY MASS INDEX: 24.66 KG/M2 | TEMPERATURE: 98.1 F | WEIGHT: 182.1 LBS | HEART RATE: 78 BPM

## 2025-03-23 LAB
ALBUMIN SERPL BCP-MCNC: 3.8 G/DL (ref 3.4–5)
ANION GAP SERPL CALC-SCNC: 10 MMOL/L (ref 10–20)
BACTERIA BLD CULT: NORMAL
BACTERIA FLD CULT: ABNORMAL
BACTERIA FLD CULT: NORMAL
BUN SERPL-MCNC: 12 MG/DL (ref 6–23)
CALCIUM SERPL-MCNC: 8.9 MG/DL (ref 8.6–10.3)
CHLORIDE SERPL-SCNC: 104 MMOL/L (ref 98–107)
CO2 SERPL-SCNC: 28 MMOL/L (ref 21–32)
CREAT SERPL-MCNC: 0.81 MG/DL (ref 0.5–1.3)
EGFRCR SERPLBLD CKD-EPI 2021: >90 ML/MIN/1.73M*2
ERYTHROCYTE [DISTWIDTH] IN BLOOD BY AUTOMATED COUNT: 13.5 % (ref 11.5–14.5)
GLUCOSE SERPL-MCNC: 91 MG/DL (ref 74–99)
GRAM STN SPEC: ABNORMAL
GRAM STN SPEC: ABNORMAL
GRAM STN SPEC: NORMAL
GRAM STN SPEC: NORMAL
HCT VFR BLD AUTO: 41.9 % (ref 41–52)
HGB BLD-MCNC: 13.9 G/DL (ref 13.5–17.5)
MCH RBC QN AUTO: 28.1 PG (ref 26–34)
MCHC RBC AUTO-ENTMCNC: 33.2 G/DL (ref 32–36)
MCV RBC AUTO: 85 FL (ref 80–100)
NRBC BLD-RTO: 0 /100 WBCS (ref 0–0)
PHOSPHATE SERPL-MCNC: 3.6 MG/DL (ref 2.5–4.9)
PLATELET # BLD AUTO: 249 X10*3/UL (ref 150–450)
POTASSIUM SERPL-SCNC: 3.7 MMOL/L (ref 3.5–5.3)
RBC # BLD AUTO: 4.95 X10*6/UL (ref 4.5–5.9)
SODIUM SERPL-SCNC: 138 MMOL/L (ref 136–145)
WBC # BLD AUTO: 7.4 X10*3/UL (ref 4.4–11.3)

## 2025-03-23 PROCEDURE — 85027 COMPLETE CBC AUTOMATED: CPT | Performed by: INTERNAL MEDICINE

## 2025-03-23 PROCEDURE — 99232 SBSQ HOSP IP/OBS MODERATE 35: CPT | Performed by: INTERNAL MEDICINE

## 2025-03-23 PROCEDURE — 2500000004 HC RX 250 GENERAL PHARMACY W/ HCPCS (ALT 636 FOR OP/ED): Performed by: INTERNAL MEDICINE

## 2025-03-23 PROCEDURE — 1100000001 HC PRIVATE ROOM DAILY

## 2025-03-23 PROCEDURE — 80069 RENAL FUNCTION PANEL: CPT | Performed by: INTERNAL MEDICINE

## 2025-03-23 PROCEDURE — 2500000005 HC RX 250 GENERAL PHARMACY W/O HCPCS: Performed by: INTERNAL MEDICINE

## 2025-03-23 PROCEDURE — 36415 COLL VENOUS BLD VENIPUNCTURE: CPT | Performed by: INTERNAL MEDICINE

## 2025-03-23 RX ORDER — TADALAFIL 5 MG/1
5 TABLET ORAL DAILY PRN
Status: DISCONTINUED | OUTPATIENT
Start: 2025-03-23 | End: 2025-03-24 | Stop reason: HOSPADM

## 2025-03-23 RX ADMIN — PIPERACILLIN SODIUM AND TAZOBACTAM SODIUM 3.38 G: 3; .375 INJECTION, SOLUTION INTRAVENOUS at 17:55

## 2025-03-23 RX ADMIN — PIPERACILLIN SODIUM AND TAZOBACTAM SODIUM 3.38 G: 3; .375 INJECTION, SOLUTION INTRAVENOUS at 09:55

## 2025-03-23 RX ADMIN — Medication 6 MG: at 21:21

## 2025-03-23 RX ADMIN — PIPERACILLIN SODIUM AND TAZOBACTAM SODIUM 3.38 G: 3; .375 INJECTION, SOLUTION INTRAVENOUS at 03:18

## 2025-03-23 ASSESSMENT — COGNITIVE AND FUNCTIONAL STATUS - GENERAL
DAILY ACTIVITIY SCORE: 24
MOBILITY SCORE: 24
DAILY ACTIVITIY SCORE: 24
MOBILITY SCORE: 24

## 2025-03-23 ASSESSMENT — PAIN - FUNCTIONAL ASSESSMENT
PAIN_FUNCTIONAL_ASSESSMENT: 0-10
PAIN_FUNCTIONAL_ASSESSMENT: 0-10

## 2025-03-23 ASSESSMENT — PAIN SCALES - GENERAL
PAINLEVEL_OUTOF10: 0 - NO PAIN
PAINLEVEL_OUTOF10: 0 - NO PAIN

## 2025-03-23 NOTE — PROGRESS NOTES
Alexandre Barraza is a 64 y.o. male on day 4 of admission presenting with Pelvic abscess in male (Multi).      Subjective   Patient was seen and examined bedside this morning, stated that he slept well, drain has been function enzymes well, and denies fever, chills, nausea, vomiting, chest pain or shortness of breath.  Patient brought his Cialis 5 mg tablet dose, which he takes for BPH denied help with symptom relief.  Would be entered as nonformulary due unavailability for inpatient,       Objective     Last Recorded Vitals  /69 (BP Location: Right arm, Patient Position: Lying)   Pulse 72   Temp 37.1 °C (98.8 °F) (Temporal)   Resp 17   Wt 82.6 kg (182 lb 1.6 oz)   SpO2 97%   Intake/Output last 3 Shifts:    Intake/Output Summary (Last 24 hours) at 3/23/2025 1710  Last data filed at 3/23/2025 0318  Gross per 24 hour   Intake 290 ml   Output 40 ml   Net 250 ml       Admission Weight  Weight: 83.9 kg (185 lb) (03/19/25 1607)    Daily Weight  03/23/25 : 82.6 kg (182 lb 1.6 oz)    Image Results  CT guided abscess fluid collection drainage visceral Perq  Narrative: Interpreted By:  Quincy Ohara,   STUDY:  CT GUIDED ABSCESS FLUID COLLECTION DRAINAGE VISCERAL PERQ;  3/20/2025  4:08 pm      INDICATION:  Signs/Symptoms:request drains for abscesses.      COMPARISON:  None.      ACCESSION NUMBER(S):  [id number]      ORDERING CLINICIAN:  LILA WONG      TECHNIQUE:  INTERVENTIONALIST(S):  Willie Ohara MD      CONSENT:  The patient/patient's POA/next of kin was informed of the nature of  the proposed procedure. The purposes, alternatives, risks, and  benefits were explained and discussed. All questions were answered  and consent was obtained.      RADIATION EXPOSURE:  DLP: 791.3 mGy*cm      SEDATION:  Moderate conscious IV sedation services (supervision of  administration, induction, and maintenance) were provided by the  physician performing the procedure with intravenous fentanyl 100 mcg  and versed 2 mg  for 52 minutes. The physician was assisted by an  independent trained observer, an interventional radiology nurse, in  the continuous monitoring of patient level of consciousness and  physiologic status.      MEDICATION/CONTRAST:  No additional      TIME OUT:  A time out was performed immediately prior to procedure start with  the interventional team, correctly identifying the patient name, date  of birth, MRN, procedure, anatomy (including marking of site and  side), patient position, procedure consent form, relevant laboratory  and imaging test results, antibiotic administration, safety  precautions, and procedure-specific equipment needs.      COMPLICATIONS:  No immediate adverse events identified.      FINDINGS:  Patient was placed in supine position. Limited axial CT images were  obtained through the abdomen and pelvis for the purposes of needle  guidance were taken. The images demonstrate the previously described  fluid collections within the right and left pelvis. Percutaneous  entry points for access into these 2 collections was identified.  Patient was prepped and draped in the usual sterile fashion.      Attention was 1st turned to drainage of the left pelvic collection.  Lidocaine was used for local anesthesia. Utilizing intermittent CT  guidance a 19G Yueh needle was used to access the fluid collection. A  035 wire was then used to maintain access in the collection after  removal of the Yueh needle. Serial dilatation was had over the wire  and an eventual 10 Irish pigtail drainage catheter was placed in the  collection. The pigtail drain was formed, connected to drain, and  sutured/secured in place. Proximally 30 cc of clear yellow fluid was  sent for analysis.      Attention was next turned to drainage of the right pelvic collection.  Lidocaine was used for local anesthesia. Utilizing intermittent CT  guidance a 19G Yueh needle was used to access the fluid collection. A  035 wire was then used to  maintain access in the collection after  removal of the Yueh needle. Serial dilatation was had over the wire  and an eventual 10 Tunisian pigtail drainage catheter was placed in the  collection. The pigtail drain was formed, connected to drain, and  sutured/secured in place. Proximally 10 cc of clear yellow fluid was  sent for analysis.      Postprocedure images demonstrated no evidence of hemorrhage and  near-complete resolution of the bilateral pelvic fluid collections.  The patient tolerated the procedure well without any immediate  complications.      Impression: Uneventful CT guided 10 Tunisian pigtail drainage catheter placement of  the left pelvic fluid collection, as detailed above. Uneventful  CT-guided 10 Tunisian pigtail drainage catheter placement of the right  pelvic fluid collection, as detailed above.      Performed and dictated at [facility].      MACRO:  None      Signed by: Quincy Ohara 3/21/2025 11:41 AM  Dictation workstation:   [id number]      Physical Exam  Constitutional: Alert active, cooperative not in acute distress  Eyes: PERRLA, clear sclera  ENMT: Moist mucosal membranes, no exudate  Head / Neck: Atraumatic, normocephalic, supple neck, JVP not visualized  Lungs: Patent airways, CTABL  Heart: RRR, S1S2, no murmurs appreciated, palpable pulses in all extremities  GI: Soft, NT, ND, bowel sounds present in all quadrants.  MARIANA drain bilaterally in the low back draining seropurulent fluid  MSK: Moves all extremities freely, no restriction  of ROM, no joint edema  Extremities: Intact x 4, no peripheral edema  : No Croft catheter inserted  Breast: Deferred  Neurological: AAO x 3 to person, place and date, facial muscles symmetrical, sensation intact, strength 4/4, no acute focal neurological deficits appreciated  Psychological: Appropriate mood and behavior  Relevant Results             Scheduled medications  calcium carbonate, 500 mg, oral,  Daily  enoxaparin, 40 mg, subcutaneous, q24h  melatonin, 6 mg, oral, Nightly  Non-Formulary Medication, 1 each, oral, Nightly  piperacillin-tazobactam, 3.375 g, intravenous, q8h  polyethylene glycol, 17 g, oral, Daily      Continuous medications     PRN medications  PRN medications: acetaminophen **OR** acetaminophen **OR** acetaminophen, morphine, ondansetron **OR** ondansetron, tadalafil    Assessment/Plan       64 y.o. male presenting with chills suddenly developed chills, lightheadedness, and nausea. He had to go lie down. He then came to the ED. He denies any pelvic pain or urinary issues of late. He had radical prostatectomy with lymph node resection back in November; there was no cancer in his lymph nodes; he has been cancer free since then. Work-up in ED shows 2 pelvic abscesses.         Pelvic mass  -CT abdomen pelvis shows bilateral pelvic sidewall extraperitoneal rim-enhancing fluid collection alongside the inferior margin of the external iliac vein, likely abscess  - Zosyn 3.375 g IVPB q6h    -IR consulted status post drain placement uneventfully  -ID consult: Recommend continuing antibiotic as above, monitor for side effects  -Fluid Gram stain shows gram-positive cocci    BPH  -Takes Cialis 5 mg nightly, will be ordered as nonformulary     Diet  -Regular     DVT prophylaxis  -Lovenox 40 mg subcu daily     Disposition: Presenting with pelvic pain, diagnosed with bilateral pelvic abscesses, need further management, discharge pending clinical stability, and final recommendation by surgery and ID           Vickey German DO

## 2025-03-23 NOTE — CARE PLAN
The patient's goals for the shift include      The clinical goals for the shift include Pt will remain hemodynamically stable by end of shift    Over the shift, the patient did make progress toward the following goals.     Problem: Pain - Adult  Goal: Verbalizes/displays adequate comfort level or baseline comfort level  Outcome: Progressing     Problem: Safety - Adult  Goal: Free from fall injury  Outcome: Progressing

## 2025-03-24 ENCOUNTER — PHARMACY VISIT (OUTPATIENT)
Dept: PHARMACY | Facility: CLINIC | Age: 65
End: 2025-03-24
Payer: MEDICARE

## 2025-03-24 ENCOUNTER — APPOINTMENT (OUTPATIENT)
Dept: RADIOLOGY | Facility: HOSPITAL | Age: 65
DRG: 862 | End: 2025-03-24
Payer: COMMERCIAL

## 2025-03-24 VITALS
HEIGHT: 72 IN | SYSTOLIC BLOOD PRESSURE: 127 MMHG | TEMPERATURE: 98.2 F | DIASTOLIC BLOOD PRESSURE: 81 MMHG | OXYGEN SATURATION: 98 % | RESPIRATION RATE: 16 BRPM | WEIGHT: 182.1 LBS | HEART RATE: 85 BPM | BODY MASS INDEX: 24.66 KG/M2

## 2025-03-24 LAB
ANION GAP SERPL CALC-SCNC: 11 MMOL/L (ref 10–20)
BACTERIA BLD CULT: NORMAL
BUN SERPL-MCNC: 12 MG/DL (ref 6–23)
CALCIUM SERPL-MCNC: 8.9 MG/DL (ref 8.6–10.3)
CHLORIDE SERPL-SCNC: 104 MMOL/L (ref 98–107)
CO2 SERPL-SCNC: 24 MMOL/L (ref 21–32)
CREAT SERPL-MCNC: 0.81 MG/DL (ref 0.5–1.3)
EGFRCR SERPLBLD CKD-EPI 2021: >90 ML/MIN/1.73M*2
ERYTHROCYTE [DISTWIDTH] IN BLOOD BY AUTOMATED COUNT: 13.3 % (ref 11.5–14.5)
GLUCOSE SERPL-MCNC: 98 MG/DL (ref 74–99)
HCT VFR BLD AUTO: 39.3 % (ref 41–52)
HGB BLD-MCNC: 13.4 G/DL (ref 13.5–17.5)
HOLD SPECIMEN: NORMAL
MCH RBC QN AUTO: 28.5 PG (ref 26–34)
MCHC RBC AUTO-ENTMCNC: 34.1 G/DL (ref 32–36)
MCV RBC AUTO: 83 FL (ref 80–100)
NRBC BLD-RTO: 0 /100 WBCS (ref 0–0)
PLATELET # BLD AUTO: 230 X10*3/UL (ref 150–450)
POTASSIUM SERPL-SCNC: 3.7 MMOL/L (ref 3.5–5.3)
RBC # BLD AUTO: 4.71 X10*6/UL (ref 4.5–5.9)
SODIUM SERPL-SCNC: 135 MMOL/L (ref 136–145)
WBC # BLD AUTO: 7.2 X10*3/UL (ref 4.4–11.3)

## 2025-03-24 PROCEDURE — 99232 SBSQ HOSP IP/OBS MODERATE 35: CPT

## 2025-03-24 PROCEDURE — 99239 HOSP IP/OBS DSCHRG MGMT >30: CPT | Performed by: INTERNAL MEDICINE

## 2025-03-24 PROCEDURE — RXMED WILLOW AMBULATORY MEDICATION CHARGE

## 2025-03-24 PROCEDURE — 36415 COLL VENOUS BLD VENIPUNCTURE: CPT | Performed by: INTERNAL MEDICINE

## 2025-03-24 PROCEDURE — 80048 BASIC METABOLIC PNL TOTAL CA: CPT | Performed by: INTERNAL MEDICINE

## 2025-03-24 PROCEDURE — 85027 COMPLETE CBC AUTOMATED: CPT | Performed by: INTERNAL MEDICINE

## 2025-03-24 PROCEDURE — 72192 CT PELVIS W/O DYE: CPT

## 2025-03-24 PROCEDURE — 2500000004 HC RX 250 GENERAL PHARMACY W/ HCPCS (ALT 636 FOR OP/ED): Performed by: INTERNAL MEDICINE

## 2025-03-24 RX ORDER — AMOXICILLIN AND CLAVULANATE POTASSIUM 875; 125 MG/1; MG/1
1 TABLET, FILM COATED ORAL 2 TIMES DAILY
Qty: 28 TABLET | Refills: 0 | Status: SHIPPED | OUTPATIENT
Start: 2025-03-24 | End: 2025-04-07

## 2025-03-24 RX ADMIN — PIPERACILLIN SODIUM AND TAZOBACTAM SODIUM 3.38 G: 3; .375 INJECTION, SOLUTION INTRAVENOUS at 02:27

## 2025-03-24 RX ADMIN — PIPERACILLIN SODIUM AND TAZOBACTAM SODIUM 3.38 G: 3; .375 INJECTION, SOLUTION INTRAVENOUS at 11:49

## 2025-03-24 ASSESSMENT — COGNITIVE AND FUNCTIONAL STATUS - GENERAL
MOBILITY SCORE: 24
DAILY ACTIVITIY SCORE: 24

## 2025-03-24 ASSESSMENT — PAIN SCALES - GENERAL
PAINLEVEL_OUTOF10: 0 - NO PAIN
PAINLEVEL_OUTOF10: 0 - NO PAIN

## 2025-03-24 ASSESSMENT — PAIN - FUNCTIONAL ASSESSMENT
PAIN_FUNCTIONAL_ASSESSMENT: 0-10
PAIN_FUNCTIONAL_ASSESSMENT: 0-10

## 2025-03-24 NOTE — CARE PLAN
The clinical goals for the shift include patient's safety and comfort will be maintained throughout this shift.        Problem: Pain - Adult  Goal: Verbalizes/displays adequate comfort level or baseline comfort level  Outcome: Progressing     Problem: Safety - Adult  Goal: Free from fall injury  Outcome: Progressing     Problem: Discharge Planning  Goal: Discharge to home or other facility with appropriate resources  Outcome: Progressing     Problem: Chronic Conditions and Co-morbidities  Goal: Patient's chronic conditions and co-morbidity symptoms are monitored and maintained or improved  Outcome: Progressing     Problem: Nutrition  Goal: Nutrient intake appropriate for maintaining nutritional needs  Outcome: Progressing

## 2025-03-24 NOTE — DISCHARGE SUMMARY
Discharge Diagnosis  Pelvic abscess in male (Multi)    Issues Requiring Follow-Up  Follow-up with PCP, and urologist    Discharge Meds     Medication List      START taking these medications     amoxicillin-pot clavulanate 875-125 mg tablet; Commonly known as:   Augmentin; Take 1 tablet by mouth 2 times a day for 14 days.     CONTINUE taking these medications     tadalafil 5 mg tablet; Commonly known as: Cialis       Test Results Pending At Discharge  Pending Labs       No current pending labs.            Hospital Course   Alexandre Barraza is a 64 y.o. male presenting with chills. Wale has been feeling fine of late. Yesterday morning, while in the shower, he suddenly developed chills, lightheadedness, and nausea. He had to go lie down. He then came to the ED. He denies any pelvic pain or urinary issues of late. He had radical prostatectomy with lymph node resection back in November; there was no cancer in his lymph nodes; he has been cancer free since then. Work-up in ED shows 2 pelvic abscesses.     Patient was admitted for further management, and started on Zosyn 3.375 g IV piggyback every 8 hours, with first dose ordered March 19, 2025.  ID was consulted, evaluated and agree with management with daily assessment to discharge.   IR was consulted, and had 2 MARIANA drain in place bilaterally, which were closely monitored through hospital course, with daily drainage, resulting in significant draining of abscess.  IR monitor drain regularly, and had an uneventful hospital course.  A.m. lab collected, with no leukocytosis, overall results within acceptable range for both BMP and CBC with differential.  Patient has uneventful hospital course, and on March 24, 2025, was found stable for discharge home after removal of both drain seamlessly by IR, and ID recommended transitioning to oral Augmentin and  mg 1 tablet twice a day for 2 weeks.  Prescription was sent for med to bed prior to discharge.  Patient is to  follow-up with his PCP and urologist.    Greater than 30-minute were dedicated to this discharge that included educating patient and coordinating care.      Pertinent Physical Exam At Time of Discharge  Physical Exam  Constitutional: Alert active, cooperative not in acute distress  Eyes: PERRLA, clear sclera  ENMT: Moist mucosal membranes, no exudate  Head / Neck: Atraumatic, normocephalic, supple neck, JVP not visualized  Lungs: Patent airways, CTABL  Heart: RRR, S1S2, no murmurs appreciated, palpable pulses in all extremities  GI: Soft, NT, ND, bowel sounds present in all quadrants.  MARIANA drain bilaterally in the low back draining seropurulent fluid, removed  MSK: Moves all extremities freely, no restriction  of ROM, no joint edema  Extremities: Intact x 4, no peripheral edema  : No Croft catheter inserted  Breast: Deferred  Neurological: AAO x 3 to person, place and date, facial muscles symmetrical, sensation intact, strength 4/4, no acute focal neurological deficits appreciated  Psychological: Appropriate mood and behavior  Outpatient Follow-Up  No future appointments.      Vickey German DO

## 2025-03-24 NOTE — SIGNIFICANT EVENT
CT Pelvis was performed which was personally reviewed and discussed with Dr. Ohara, significant for collapsed fluid pockets where the drains terminate. There is a small 1cm pocket of fluid with in the left pelvic sidewall measuring 1 cm. He has had decreased output from these collections, reports they have been flushed but have not been documenting the flushes. The output has been less than 30 mL per 24 hrs.     Discussed risks and benefits of removing the drains. Both sites were prepped and draped in sterile fashion. The right drain skin sutures were removed and the drain was cut and removed and a sterile dressing was applied. We then moved to the left sided drain. The skin suture was cut and removed and the drain was cut and removed. A sterile dressing was applied.     Educated on s/sx to monitor for. Recommended he follow up with his surgeon in the OP setting.     Ok to be discharged from an IR perspective.

## 2025-03-24 NOTE — CARE PLAN
The patient's goals for the shift include      The clinical goals for the shift include patient's safety and comfort will be maintained throughout this shift.    Over the shift, the patient did make progress toward the following goals. Barriers to progression include       Problem: Pain - Adult  Goal: Verbalizes/displays adequate comfort level or baseline comfort level  Outcome: Progressing  Flowsheets (Taken 3/24/2025 1735)  Verbalizes/displays adequate comfort level or baseline comfort level:   Encourage patient to monitor pain and request assistance   Assess pain using appropriate pain scale   Administer analgesics based on type and severity of pain and evaluate response   Implement non-pharmacological measures as appropriate and evaluate response   Consider cultural and social influences on pain and pain management   Notify Licensed Independent Practitioner if interventions unsuccessful or patient reports new pain     Problem: Safety - Adult  Goal: Free from fall injury  Outcome: Progressing  Flowsheets (Taken 3/24/2025 1735)  Free from fall injury: Instruct family/caregiver on patient safety     Problem: Discharge Planning  Goal: Discharge to home or other facility with appropriate resources  Outcome: Progressing  Flowsheets (Taken 3/24/2025 1735)  Discharge to home or other facility with appropriate resources:   Identify barriers to discharge with patient and caregiver   Arrange for needed discharge resources and transportation as appropriate   Identify discharge learning needs (meds, wound care, etc)   Arrange for interpreters to assist at discharge as needed   Refer to discharge planning if patient needs post-hospital services based on physician order or complex needs related to functional status, cognitive ability or social support system

## 2025-03-24 NOTE — PROGRESS NOTES
"  INFECTIOUS DISEASE DAILY PROGRESS NOTE    SUBJECTIVE:    No new issues. Feels better. No fever.    OBJECTIVE:  VITALS (Last 24 Hours)  /74 (BP Location: Right arm, Patient Position: Lying)   Pulse 80   Temp 36.7 °C (98.1 °F) (Temporal)   Resp 16   Ht 1.829 m (6' 0.01\")   Wt 82.6 kg (182 lb 1.6 oz)   SpO2 95%   BMI 24.69 kg/m²     PHYSICAL EXAM:  Gen - NAD  Lungs - clear bilaterally, no wh  Abd - soft, no ttp, 2x MARIANA drains  Skin - no rash    ABX: IV Zosyn    LABS:  Lab Results   Component Value Date    WBC 7.2 03/24/2025    HGB 13.4 (L) 03/24/2025    HCT 39.3 (L) 03/24/2025    MCV 83 03/24/2025     03/24/2025     Lab Results   Component Value Date    GLUCOSE 98 03/24/2025    CALCIUM 8.9 03/24/2025     (L) 03/24/2025    K 3.7 03/24/2025    CO2 24 03/24/2025     03/24/2025    BUN 12 03/24/2025    CREATININE 0.81 03/24/2025     Results from last 72 hours   Lab Units 03/23/25  0732   ALBUMIN g/dL 3.8     Estimated Creatinine Clearance: 103.8 mL/min (by C-G formula based on SCr of 0.81 mg/dL).    IMAGING:  CT Pelvis 3/24  Impression:     Fluid collections in the right pelvis and anterior left lower  quadrant no longer seen following percutaneous drain placement. A  small fluid collection along the left pelvic sidewall is decreased in  size.    Bladder wall thickening and adjacent fat stranding concerning for  cystitis again seen..       ASSESSMENT/PLAN:     Intra-Abd Abscesses - s/p drain placement. Culture with Group B Strep.    I ordered a CT pelvis this morning which is back now and abscesses are resolved.    I would be fine with discharge today. I assume the drains can be removed.    Can be on PO Augmentin 875mg BID for 2 weeks.      Monitoring for adverse effects of abx such as rash/itching/diarrhea - none.    Will sign off. Please call back with questions. Thanks!    Ovi Metz MD  [facility]  Office #[phone]      "

## 2025-03-24 NOTE — PROGRESS NOTES
"Subjective   Interval History: Does report right leg discomfort from drain placement, but has been improving, no evidence of paresthesias. Reports feeling much better, has been tolerating PO intake. Denies any abdominal pain, fever, chills, urinary or stool changes.     Objective   Fluid culture with Group B strep  Vital signs in last 24 hours:  /76 (BP Location: Right arm, Patient Position: Lying)   Pulse 80   Temp 37.1 °C (98.8 °F) (Temporal)   Resp 16   Wt 82.6 kg (182 lb 1.6 oz)   SpO2 94%     Intake/Output last 3 shifts:  I/O last 3 completed shifts:  In: 290 (3.5 mL/kg) [P.O.:240; IV Piggyback:50]  Out: 40 (0.5 mL/kg) [Drains:40]  Weight: 82.6 kg   Intake/Output this shift:  No intake/output data recorded.    Physical Exam  Neuro: oriented to person, place, time, and general circumstances  HEENT: normocephalic, atraumatic  Pulm: clear to auscultation bilaterally, no wheezes, good air entry  Cardiac: Regular rate and rhythm or without murmur or extra heart sounds  Abdomen: soft, nontender, normal bowel sounds, bilateral pelvic drain access site C/D/I.    Relevant Results  LABS:  Lab Results   Component Value Date    WBC 7.2 03/24/2025    HGB 13.4 (L) 03/24/2025    HCT 39.3 (L) 03/24/2025    MCV 83 03/24/2025     03/24/2025      Results from last 72 hours   Lab Units 03/24/25  0621   SODIUM mmol/L 135*   POTASSIUM mmol/L 3.7   CHLORIDE mmol/L 104   CO2 mmol/L 24   BUN mg/dL 12   CREATININE mg/dL 0.81   GLUCOSE mg/dL 98   CALCIUM mg/dL 8.9   ANION GAP mmol/L 11   EGFR mL/min/1.73m*2 >90     Results from last 72 hours   Lab Units 03/23/25  0732   ALBUMIN g/dL 3.8           No lab exists for component: \"PT\"    MICRO:  Susceptibility data from last 14 days.  Collected Specimen Info Organism   03/20/25 Fluid from Intra-abdominal Abscess Streptococcus agalactiae (Group B Streptococcus)       IMAGING:  CT guided abscess fluid collection drainage visceral Perq   Final Result   Uneventful CT guided 10 " Turkish pigtail drainage catheter placement of   the left pelvic fluid collection, as detailed above. Uneventful   CT-guided 10 Turkish pigtail drainage catheter placement of the right   pelvic fluid collection, as detailed above.        Performed and dictated at [facility].        MACRO:   None        Signed by: Quincy Ohara 3/21/2025 11:41 AM   Dictation workstation:   [id number]      CT head wo IV contrast   Final Result   1. No acute intracranial hemorrhage or mass effect.   2. Patchy white matter hypodensities which are nonspecific but likely   related to microangiopathic white matter changes.        Signed by: Titi Rodriguez 3/19/2025 6:18 PM   Dictation workstation:   [id number]      CT abdomen pelvis w IV contrast   Final Result   1. Bilateral pelvic sidewall extraperitoneal rim enhancing fluid   collections along the inferior margin of the external iliac veins,   likely abscesses.   2. Mild diffuse bladder wall thickening may reflect mild cystitis.   3. Postsurgical change related to prostatectomy.        Signed by: Titi Rodriguez 3/19/2025 6:30 PM   Dictation workstation:   [id number]      XR chest 2 views   Final Result   1.  No evidence of acute cardiopulmonary process.                  MACRO:   None        Signed by: Joseph Schoenberger 3/19/2025 4:43 PM   Dictation workstation:   [id number]          Assessment/Plan   This is a 64 year old male presenting with chills and lightheadedness. Remote history of radical prostatectomy with lymph note resection in November. Complains of low back pain and sudden onset chills. Workup in the ED included WBC 11.3. CT A/P demonstrates a rim enhancing fluid collection in the right pelvic sidewall, small at only 3.3cm, as well as a 5.4cm left anterior pelvic collection concerning for abscess. S/p bilateral percutaneous drain placement 3/20/25 with serous output.      Drains flushed with NS, insertion site  C/D/I.  Left drain with 35ml serous output and Right drain with 5ml serosang output over the weekend.  Suspect the right sided collection (previously 3cm) has collapsed.   Repeating CT today to evaluate for residual fluid collections. If CT shows collapsed pockets, will remove drain with plan for discharge. Plan of care discussed with Dr. Metz. CT pending.    IR following.     LOS: 5 days     Roland Pereira, APRN-CNP      I personally spent over half of a total 35 minutes in counseling and discussion with the patient and coordination of care as described above.

## 2025-03-24 NOTE — PROGRESS NOTES
03/24/25 0837   Discharge Planning   Expected Discharge Disposition Home     Pt day 5 of admission presenting with Pelvic abscess in male (Multi). Cont on IV atbs. ID is following. Adod 1-2 days.

## 2025-03-24 NOTE — NURSING NOTE
